# Patient Record
Sex: FEMALE | Race: WHITE | NOT HISPANIC OR LATINO | Employment: FULL TIME | URBAN - METROPOLITAN AREA
[De-identification: names, ages, dates, MRNs, and addresses within clinical notes are randomized per-mention and may not be internally consistent; named-entity substitution may affect disease eponyms.]

---

## 2017-01-19 ENCOUNTER — OFFICE VISIT (OUTPATIENT)
Dept: SURGERY | Facility: CLINIC | Age: 52
End: 2017-01-19

## 2017-01-19 VITALS
SYSTOLIC BLOOD PRESSURE: 132 MMHG | WEIGHT: 269 LBS | DIASTOLIC BLOOD PRESSURE: 88 MMHG | HEIGHT: 65 IN | BODY MASS INDEX: 44.82 KG/M2

## 2017-01-19 DIAGNOSIS — Z09 FOLLOW UP: Primary | ICD-10-CM

## 2017-01-19 PROCEDURE — 99211 OFF/OP EST MAY X REQ PHY/QHP: CPT | Performed by: SURGERY

## 2017-01-19 RX ORDER — FLUTICASONE PROPIONATE 50 MCG
SPRAY, SUSPENSION (ML) NASAL
Refills: 1 | COMMUNITY
Start: 2016-11-03

## 2017-01-19 RX ORDER — PANTOPRAZOLE SODIUM 40 MG/1
TABLET, DELAYED RELEASE ORAL
Refills: 0 | COMMUNITY
Start: 2017-01-04

## 2017-01-19 RX ORDER — ERGOCALCIFEROL 1.25 MG/1
CAPSULE ORAL
Refills: 2 | COMMUNITY
Start: 2017-01-02

## 2017-01-19 RX ORDER — IBUPROFEN 600 MG/1
TABLET ORAL
Refills: 2 | COMMUNITY
Start: 2017-01-02 | End: 2017-12-05

## 2017-01-19 NOTE — MR AVS SNAPSHOT
Dafne Elizalde   1/19/2017 3:45 PM   Office Visit    Dept Phone:  [phone]   Encounter #:  [id number]    Provider:  Tay Alvarado MD   Department:  [facility] GENERAL SURGERY                Your Full Care Plan              Today's Medication Changes          These changes are accurate as of: 1/19/17  3:46 PM.  If you have any questions, ask your nurse or doctor.               Stop taking medication(s)listed here:     lansoprazole 30 MG capsule   Commonly known as:  PREVACID   Stopped by:  Tay Alvarado MD           oxymetazoline 0.05 % nasal spray   Commonly known as:  AFRIN   Stopped by:  Tay Alvarado MD                      Your Updated Medication List          This list is accurate as of: 1/19/17  3:46 PM.  Always use your most recent med list.                fenofibrate micronized 134 MG capsule   Commonly known as:  LOFIBRA   TAKE 1 CAPSULE DAILY       FIBER 7 PO       fluticasone 50 MCG/ACT nasal spray   Commonly known as:  FLONASE       ibuprofen 600 MG tablet   Commonly known as:  ADVIL,MOTRIN       pantoprazole 40 MG EC tablet   Commonly known as:  PROTONIX       valsartan-hydrochlorothiazide 160-25 MG per tablet   Commonly known as:  DIOVAN-HCT   TAKE 1 TABLET DAILY       vitamin D 63982 UNITS capsule capsule   Commonly known as:  ERGOCALCIFEROL               Instructions     None    Patient Instructions History      Upcoming Appointments     Visit Type Date Time Department    OFFICE VISIT 1/19/2017  3:45 PM [facility]      cashcloud Signup     [facility] cashcloud allows you to send messages to your doctor, view your test results, renew your prescriptions, schedule appointments, and more. To sign up, go to [employer] and click on the Sign Up Now link in the New User? box. Enter your cashcloud Activation Code exactly as it appears below along with the last four digits of your Social Security Number and your Date  "of Birth () to complete the sign-up process. If you do not sign up before the expiration date, you must request a new code.    [employer] Activation Code: [email or web address]  Expires: 2017  3:46 PM    If you have questions, you can email [email or web address] or call [phone] to talk to our [employer] staff. Remember, [employer] is NOT to be used for urgent needs. For medical emergencies, dial 911.               Other Info from Your Visit           Allergies     No Known Allergies      Reason for Visit     Follow-up           Vital Signs     Blood Pressure Height Weight Body Mass Index Smoking Status       132/88 65\" (165.1 cm) 269 lb (122 kg) 44.76 kg/m2 Never Smoker         "

## 2017-01-19 NOTE — PROGRESS NOTES
PATIENT INFORMATION  Dafne Elizalde  4 MO FU, COLOSTOMY CLOSURE DONE IN 2015, DISCUSS C/S, PT IS W/O COMPLAINTS     - 1965    CHIEF COMPLAINT  Chief Complaint   Patient presents with   • Follow-up       HISTORY OF PRESENT ILLNESS  HPI    Patient doing well status post colostomy closure.  She is tolerating her diet.  Regular bowel movements.  She has no complaints.      REVIEW OF SYSTEMS  Review of Systems      ACTIVE PROBLEMS  Patient Active Problem List    Diagnosis   • Abnormal computed tomography scan [R93.8]   • Abnormal serum creatinine level [R79.9]   • Acute secretory otitis media [H65.00]   • Acute sinusitis [J01.90]   • Anemia [D64.9]   • Ankle joint pain [M25.579]   • Calcaneal spur [M77.30]   • Cellulitis [L03.90]   • Cholelithiasis [K80.20]   • Chronic sinusitis [J32.9]   • Degeneration of intervertebral disc of lumbosacral region [M51.37]   • Diverticulitis of large intestine [K57.32]   • Diverticulitis of intestine [K57.92]   • Dysuria [R30.0]   • Edema [R60.9]   • Gastroesophageal reflux disease with esophagitis [K21.0]   • Fatigue [R53.83]   • Foot pain [M79.673]   • Diffuse goiter [E04.9]   • Hyperglycemia [R73.9]   • Hyperlipidemia [E78.5]   • Hypertension [I10]   • Hypothyroidism [E03.9]   • Arthralgia of hip [M25.559]   • Morbid obesity [E66.01]   • Otitis externa [H60.90]   • Otitis media [H66.90]   • Bile-induced gastritis [K29.60]   • Urinary tract infection [N39.0]   • Open wound [T14.8]   • Breast lump in female [N63]         PAST MEDICAL HISTORY  Past Medical History   Diagnosis Date   • Anemia    • Arthritis    • Benign fundic gland polyps of stomach      of large intestine   • Cardiac murmur    • Disease of thyroid gland    • Diverticulosis    • Esophageal reflux    • High cholesterol    • History of colonic polyps    • Hole in the ear drum    • Hypertension    • Internal hemorrhoids    • Otitis externa    • Peptic ulcer    • Pyloric channel ulcer    • Wound infection after  surgery          SURGICAL HISTORY  Past Surgical History   Procedure Laterality Date   • Cholecystectomy     • Colon surgery     • Hysterectomy     • Hand surgery     • Upper gastrointestinal endoscopy     • Colostomy     • Colostomy closure     • Lumbar laminectomy     • Hemicolectomy     • Mouth surgery       tooth extraction         FAMILY HISTORY  Family History   Problem Relation Age of Onset   • Hypertension Mother    • Hyperlipidemia Mother    • Colonic polyp Mother    • Other Mother      malignant neoplasm   • Colon cancer Mother    • Hypertension Father    • Stroke Father    • Cancer Sister    • Other Sister      malignant neoplasm   • Cancer Brother    • Other Brother      malignant neoplasm   • Cancer Maternal Grandmother    • Stroke Paternal Grandmother    • Other Other      malignant neoplasm         SOCIAL HISTORY  Social History     Occupational History   • Not on file.     Social History Main Topics   • Smoking status: Never Smoker   • Smokeless tobacco: Not on file   • Alcohol use Yes   • Drug use: Not on file   • Sexual activity: Not on file         CURRENT MEDICATIONS    Current Outpatient Prescriptions:   •  Misc Natural Products (FIBER 7 PO), Take  by mouth., Disp: , Rfl:   •  fenofibrate micronized (LOFIBRA) 134 MG capsule, TAKE 1 CAPSULE DAILY, Disp: 90 capsule, Rfl: 3  •  fluticasone (FLONASE) 50 MCG/ACT nasal spray, INSTILL 1 SPRAY IN EACH NOSTRIL ONCE A DAY NASALLY 90 DAYS, Disp: , Rfl: 1  •  ibuprofen (ADVIL,MOTRIN) 600 MG tablet, TAKE ONE TABLET EVERY 6 HOURS AS NEEDED FOR PAIN, Disp: , Rfl: 2  •  pantoprazole (PROTONIX) 40 MG EC tablet, TAKE ONE TABLET BY MOUTH DAILY FOR 90 DAYS, Disp: , Rfl: 0  •  valsartan-hydrochlorothiazide (DIOVAN-HCT) 160-25 MG per tablet, TAKE 1 TABLET DAILY, Disp: 90 tablet, Rfl: 2  •  vitamin D (ERGOCALCIFEROL) 37740 UNITS capsule capsule, TAKE 1 CAPSULE BY MOUTH ONCE A WEEK, Disp: , Rfl: 2    ALLERGIES  Review of patient's allergies indicates no known  "allergies.    VITALS  Vitals:    01/19/17 1515   BP: 132/88   Weight: 269 lb (122 kg)   Height: 65\" (165.1 cm)       LAST RESULTS   Abstract on 12/18/2015   Component Date Value Ref Range Status   •  Colonoscopy 08/19/2015 OP: LEE, GREGORIO POLYPS; DIVERTICULOSIS; INTERNAL HEMORRHOIDS   Final     No results found.    PHYSICAL EXAM  Physical Exam     Abdominal exam is benign.  No tenderness.  No organomegaly.  No masses.  No evidence of herniation.      ASSESSMENT    Doing well status post colostomy closure.      PLAN      Follow-up with GI for her routine colonoscopy.  We will see back on a when necessary basis.                        "

## 2017-02-09 ENCOUNTER — TELEPHONE (OUTPATIENT)
Dept: GASTROENTEROLOGY | Facility: CLINIC | Age: 52
End: 2017-02-09

## 2017-10-20 ENCOUNTER — TELEPHONE (OUTPATIENT)
Dept: GASTROENTEROLOGY | Facility: CLINIC | Age: 52
End: 2017-10-20

## 2017-10-20 DIAGNOSIS — Z80.0 FAMILY HISTORY OF COLON CANCER REQUIRING SCREENING COLONOSCOPY: ICD-10-CM

## 2017-10-20 DIAGNOSIS — Z86.010 HX OF COLONIC POLYPS: Primary | ICD-10-CM

## 2017-10-20 PROBLEM — Z86.0100 HX OF COLONIC POLYPS: Status: ACTIVE | Noted: 2017-10-20

## 2017-10-20 RX ORDER — SODIUM PICOSULFATE, MAGNESIUM OXIDE, AND ANHYDROUS CITRIC ACID 10; 3.5; 12 MG/16.1G; G/16.1G; G/16.1G
1 POWDER, METERED ORAL 2 TIMES DAILY
Qty: 1 EACH | Refills: 0 | Status: ON HOLD | OUTPATIENT
Start: 2017-10-20 | End: 2017-12-06

## 2017-10-20 NOTE — TELEPHONE ENCOUNTER
Calling to schedule colonoscopy. Patient has been scheduled on 12/6/17 to arrive at 10/15/17. Instructions have been mailed to them.

## 2017-12-05 ENCOUNTER — ANESTHESIA EVENT (OUTPATIENT)
Dept: PERIOP | Facility: HOSPITAL | Age: 52
End: 2017-12-05

## 2017-12-05 RX ORDER — MELOXICAM 15 MG/1
15 TABLET ORAL DAILY
COMMUNITY
End: 2020-02-27 | Stop reason: ALTCHOICE

## 2017-12-06 ENCOUNTER — ANESTHESIA (OUTPATIENT)
Dept: PERIOP | Facility: HOSPITAL | Age: 52
End: 2017-12-06

## 2017-12-06 ENCOUNTER — HOSPITAL ENCOUNTER (OUTPATIENT)
Facility: HOSPITAL | Age: 52
Setting detail: HOSPITAL OUTPATIENT SURGERY
Discharge: HOME | End: 2017-12-06
Attending: INTERNAL MEDICINE | Admitting: INTERNAL MEDICINE

## 2017-12-06 VITALS
HEART RATE: 73 BPM | SYSTOLIC BLOOD PRESSURE: 135 MMHG | TEMPERATURE: 97.9 F | BODY MASS INDEX: 46.08 KG/M2 | RESPIRATION RATE: 18 BRPM | DIASTOLIC BLOOD PRESSURE: 94 MMHG | OXYGEN SATURATION: 96 % | HEIGHT: 65 IN | WEIGHT: 276.6 LBS

## 2017-12-06 DIAGNOSIS — Z86.010 HX OF COLONIC POLYPS: ICD-10-CM

## 2017-12-06 DIAGNOSIS — Z80.0 FAMILY HISTORY OF COLON CANCER REQUIRING SCREENING COLONOSCOPY: ICD-10-CM

## 2017-12-06 PROCEDURE — 25010000002 PROPOFOL 10 MG/ML EMULSION: Performed by: ANESTHESIOLOGY

## 2017-12-06 PROCEDURE — 45385 COLONOSCOPY W/LESION REMOVAL: CPT | Performed by: INTERNAL MEDICINE

## 2017-12-06 PROCEDURE — 45380 COLONOSCOPY AND BIOPSY: CPT | Performed by: INTERNAL MEDICINE

## 2017-12-06 RX ORDER — PROPOFOL 10 MG/ML
VIAL (ML) INTRAVENOUS AS NEEDED
Status: DISCONTINUED | OUTPATIENT
Start: 2017-12-06 | End: 2017-12-06 | Stop reason: SURG

## 2017-12-06 RX ORDER — MAGNESIUM HYDROXIDE 1200 MG/15ML
LIQUID ORAL AS NEEDED
Status: DISCONTINUED | OUTPATIENT
Start: 2017-12-06 | End: 2017-12-06 | Stop reason: HOSPADM

## 2017-12-06 RX ORDER — LIDOCAINE HYDROCHLORIDE 20 MG/ML
INJECTION, SOLUTION INFILTRATION; PERINEURAL AS NEEDED
Status: DISCONTINUED | OUTPATIENT
Start: 2017-12-06 | End: 2017-12-06 | Stop reason: SURG

## 2017-12-06 RX ORDER — SODIUM CHLORIDE, SODIUM LACTATE, POTASSIUM CHLORIDE, CALCIUM CHLORIDE 600; 310; 30; 20 MG/100ML; MG/100ML; MG/100ML; MG/100ML
9 INJECTION, SOLUTION INTRAVENOUS CONTINUOUS
Status: DISCONTINUED | OUTPATIENT
Start: 2017-12-06 | End: 2017-12-06 | Stop reason: HOSPADM

## 2017-12-06 RX ORDER — SODIUM CHLORIDE 9 MG/ML
40 INJECTION, SOLUTION INTRAVENOUS AS NEEDED
Status: DISCONTINUED | OUTPATIENT
Start: 2017-12-06 | End: 2017-12-06 | Stop reason: HOSPADM

## 2017-12-06 RX ORDER — LIDOCAINE HYDROCHLORIDE 10 MG/ML
0.5 INJECTION, SOLUTION EPIDURAL; INFILTRATION; INTRACAUDAL; PERINEURAL ONCE AS NEEDED
Status: COMPLETED | OUTPATIENT
Start: 2017-12-06 | End: 2017-12-06

## 2017-12-06 RX ORDER — SODIUM CHLORIDE 0.9 % (FLUSH) 0.9 %
1-10 SYRINGE (ML) INJECTION AS NEEDED
Status: DISCONTINUED | OUTPATIENT
Start: 2017-12-06 | End: 2017-12-06 | Stop reason: HOSPADM

## 2017-12-06 RX ADMIN — PROPOFOL 900 MG: 10 INJECTION, EMULSION INTRAVENOUS at 10:22

## 2017-12-06 RX ADMIN — LIDOCAINE HYDROCHLORIDE 80 MG: 20 INJECTION, SOLUTION INFILTRATION; PERINEURAL at 10:22

## 2017-12-06 RX ADMIN — SODIUM CHLORIDE, POTASSIUM CHLORIDE, SODIUM LACTATE AND CALCIUM CHLORIDE 9 ML/HR: 600; 310; 30; 20 INJECTION, SOLUTION INTRAVENOUS at 10:10

## 2017-12-06 RX ADMIN — LIDOCAINE HYDROCHLORIDE 0.1 ML: 10 INJECTION, SOLUTION EPIDURAL; INFILTRATION; INTRACAUDAL; PERINEURAL at 10:10

## 2017-12-06 RX ADMIN — LIDOCAINE HYDROCHLORIDE 1 ML: 20 JELLY TOPICAL at 10:59

## 2017-12-06 NOTE — PLAN OF CARE
Problem: Patient Care Overview (Adult)  Goal: Plan of Care Review  Outcome: Ongoing (interventions implemented as appropriate)    12/06/17 1010   Coping/Psychosocial Response Interventions   Plan Of Care Reviewed With patient;friend   Patient Care Overview   Progress no change   Outcome Evaluation   Outcome Summary/Follow up Plan VSS, C/O CHRONIC GEN. PAIN, READY FOR PROCEDURE       Goal: Adult Individualization and Mutuality  Outcome: Ongoing (interventions implemented as appropriate)    Problem: GI Endoscopy (Adult)  Goal: Signs and Symptoms of Listed Potential Problems Will be Absent or Manageable (GI Endoscopy)  Outcome: Ongoing (interventions implemented as appropriate)

## 2017-12-06 NOTE — H&P
[facility]  Pre-procedure H&P    Chief Complaint: history of polyps    Dafne Elizalde is a 52 y.o. female  who is referred by Beena Wall MD for a colonoscopy. She is an Asymptomatic person Age 50 years and older who has a history of previous adenomatous polyp.   She has had colonoscopy 2 years ago with abnormalities..    She denies any change in bowel function, melena, hematochezia or unexplained weight loss.    Past Medical History:   Diagnosis Date   • Anemia    • Arthritis    • Benign fundic gland polyps of stomach     of large intestine   • Cardiac murmur    • Disease of thyroid gland    • Diverticulosis    • Esophageal reflux    • High cholesterol    • History of colonic polyps    • Hole in the ear drum    • Hypertension    • Internal hemorrhoids    • Otitis externa    • Peptic ulcer    • Pyloric channel ulcer    • Wound infection after surgery        Past Surgical History:   Procedure Laterality Date   • CHOLECYSTECTOMY     • COLON SURGERY     • COLOSTOMY     • COLOSTOMY CLOSURE     • HAND SURGERY     • HEMICOLECTOMY     • HYSTERECTOMY     • LUMBAR LAMINECTOMY     • MOUTH SURGERY      tooth extraction   • UPPER GASTROINTESTINAL ENDOSCOPY         Family History   Problem Relation Age of Onset   • Hypertension Mother    • Hyperlipidemia Mother    • Colonic polyp Mother    • Other Mother      malignant neoplasm   • Colon cancer Mother    • Hypertension Father    • Stroke Father    • Cancer Sister    • Other Sister      malignant neoplasm   • Cancer Brother    • Other Brother      malignant neoplasm   • Cancer Maternal Grandmother    • Stroke Paternal Grandmother    • Other Other      malignant neoplasm       Social History     Social History   • Marital status: Single     Spouse name: N/A   • Number of children: N/A   • Years of education: N/A     Occupational History   • Not on file.     Social History Main Topics   • Smoking status: Current Every Day Smoker     Packs/day: 1.00      Years: 40.00     Types: Cigarettes, Electronic Cigarette   • Smokeless tobacco: Never Used   • Alcohol use Yes      Comment: occ   • Drug use: No   • Sexual activity: Defer     Other Topics Concern   • Not on file     Social History Narrative         Current Facility-Administered Medications:   •  lactated ringers infusion, 9 mL/hr, Intravenous, Continuous, Trenton Carias CRNA, Last Rate: 9 mL/hr at 12/06/17 1010, 9 mL/hr at 12/06/17 1010  •  sodium chloride 0.9 % flush 1-10 mL, 1-10 mL, Intravenous, PRN, Trenton Carias CRNA  •  sodium chloride 0.9 % infusion 40 mL, 40 mL, Intravenous, PRN, Trenton Carias CRNA  •  sterile water 1,000 mL with simethicone 40 MG/0.6ML 3 mL mixture, , , PRN, Beena Wall MD, 150 mL at 12/06/17 0947  •  sterile water irrigation solution, , , PRN, Beena Wall MD, 500 mL at 12/06/17 0947    No Known Allergies         Vitals:    12/06/17 1004   BP: 136/74   Pulse: 75   Resp: 16   Temp: 97.9 °F (36.6 °C)   SpO2: 94%       Physical Exam:   General: patient awake, alert and cooperative   Eyes: Normal lids and lashes, no scleral icterus   Neck: supple, normal ROM   Skin: warm and dry, not jaundiced   Cardiovascular: regular rhythm and rate, no murmurs auscultated   Pulm: clear to auscultation bilaterally, regular and unlabored   Abdomen: soft, nontender, nondistended; normal bowel sounds   Extremities: no rash or edema   Psychiatric: Normal mood and behavior         Assessment/Plan       [unfilled]    Schedule for colonoscopy.      Indications, risks and procedure were discussed with the patient, including but not limited to, bleeding, infection, possibility of perforation and possible polypectomy. All of the patient's questions were answered, and signed informed consent was obtained and placed on the chart.         Beena Wall MD  [facility]  [unfilled]

## 2017-12-06 NOTE — PLAN OF CARE
Problem: GI Endoscopy (Adult)  Goal: Signs and Symptoms of Listed Potential Problems Will be Absent or Manageable (GI Endoscopy)  Outcome: Ongoing (interventions implemented as appropriate)    12/06/17 4676   GI Endoscopy   Problems Assessed (GI Endoscopy) all   Problems Present (GI Endoscopy) none

## 2017-12-06 NOTE — ANESTHESIA POSTPROCEDURE EVALUATION
Patient: Dafne Elizalde    Procedure Summary     Date Anesthesia Start Anesthesia Stop Room / Location    12/06/17 1019 1126 [facility] / [facility]       Procedure Diagnosis Surgeon Provider    COLONOSCOPY with polpectomy (N/A ) Hx of colonic polyps; Family history of colon cancer requiring screening colonoscopy  (Hx of colonic polyps [Z86.010]; Family history of colon cancer requiring screening colonoscopy [Z80.0]) MD Charu Ortega MD          Anesthesia Type: MAC  Last vitals  BP   135/94 (12/06/17 1151)   Temp   97.9 °F (36.6 °C) (12/06/17 1127)   Pulse   73 (12/06/17 1151)   Resp   18 (12/06/17 1151)     SpO2   96 % (12/06/17 1151)     Post Anesthesia Care and Evaluation    Patient location during evaluation: PHASE II  Patient participation: complete - patient participated  Level of consciousness: awake and alert  Pain score: 0  Pain management: adequate  Airway patency: patent  Anesthetic complications: No anesthetic complications  PONV Status: none  Cardiovascular status: acceptable  Respiratory status: acceptable  Hydration status: acceptable

## 2017-12-06 NOTE — ANESTHESIA PREPROCEDURE EVALUATION
Anesthesia Evaluation     Patient summary reviewed and Nursing notes reviewed   NPO Solid Status: > 8 hours  NPO Liquid Status: > 8 hours     Airway   Mallampati: I  TM distance: >3 FB  Neck ROM: limited  no difficulty expected  Dental - normal exam     Pulmonary - normal exam   (+) a smoker (1 ppd) Current, shortness of breath,   Cardiovascular - normal exam    Rhythm: regular  Rate: normal    (+) hypertension, valvular problems/murmurs murmur, hyperlipidemia      Neuro/Psych- negative ROS  GI/Hepatic/Renal/Endo    (+) obesity, morbid obesity, GERD well controlled, PUD, hypothyroidism,     Musculoskeletal     Abdominal   (+) obese,    Substance History      OB/GYN          Other   (+) arthritis     (-) history of cancer                                  Anesthesia Plan    ASA 3     MAC     intravenous and inhalational induction   Anesthetic plan and risks discussed with patient and other.

## 2017-12-06 NOTE — OP NOTE
Colonoscopy Note:    Indication:  History of colon polyps    Consent: Procedure colonoscopy was explained to the patient and detail including but not limited to the, patient of bleeding perforation and possible reactions to sedation.    Sedation: Sedation was provided by anesthesia.    Procedure:  After excellent sedation digital rectal examinations performed and a flexible colonoscope was inserted into the rectum passed to about the sigmoid colon where her previous anastomosis is noted.  Since widely patent.  The scope disease was traversed and to the cecum the cecum was identified by both the ileocecal valve and the appendiceal orifice.  The overall bowel preparation was fair.  Upon withdrawal the scope careful examination mucosa was made.  Pertinent findings include a total of 3 descending colon polyps one was 7 mm removed completely using snare cautery the other 2 were between 2 and 4 mm sessile removed or sepsis.  One of the descending polyps were put in a separate bottle as this is removed in a piecemeal fashion.  Upon further withdrawal scope to transverse polyps was seen and removed completely with forceps, they were between 3 and 4 mm sessile.  In the descending colon she had a total of single polyp that was 3 mm sessile removed with forceps.  In the sigmoid colon she had mild diverticulosis.  The scope was slowly withdrawn to the rectum and retroflex were internal hemorrhoids are noted.  The scope was straightened and withdrawn out of the patient with no immediate call patient's and she tolerated procedure well.    Impression/Plan:  Colon polyps removed with forceps and snare cautery  Diverticulosis  Internal hemorrhoids  We'll await biopsy results.  Colonoscopy the recommended based on pathology results but likely within one to 2 years.

## 2017-12-06 NOTE — PLAN OF CARE
Problem: Patient Care Overview (Adult)  Goal: Plan of Care Review  Outcome: Ongoing (interventions implemented as appropriate)    12/06/17 1101   Coping/Psychosocial Response Interventions   Plan Of Care Reviewed With patient;family   Patient Care Overview   Progress improving       Goal: Adult Individualization and Mutuality  Outcome: Ongoing (interventions implemented as appropriate)  Goal: Discharge Needs Assessment  Outcome: Ongoing (interventions implemented as appropriate)    Problem: GI Endoscopy (Adult)  Goal: Signs and Symptoms of Listed Potential Problems Will be Absent or Manageable (GI Endoscopy)  Outcome: Ongoing (interventions implemented as appropriate)

## 2017-12-11 LAB
LAB AP CASE REPORT: NORMAL
Lab: NORMAL
PATH REPORT.FINAL DX SPEC: NORMAL

## 2018-01-03 PROBLEM — D12.2 ADENOMATOUS POLYP OF ASCENDING COLON: Status: ACTIVE | Noted: 2018-01-03

## 2019-08-14 ENCOUNTER — OFFICE VISIT (OUTPATIENT)
Dept: GASTROENTEROLOGY | Facility: CLINIC | Age: 54
End: 2019-08-14

## 2019-08-14 VITALS
WEIGHT: 287.4 LBS | DIASTOLIC BLOOD PRESSURE: 76 MMHG | BODY MASS INDEX: 47.88 KG/M2 | HEIGHT: 65 IN | SYSTOLIC BLOOD PRESSURE: 124 MMHG

## 2019-08-14 DIAGNOSIS — Z86.010 HISTORY OF COLONIC POLYPS: ICD-10-CM

## 2019-08-14 DIAGNOSIS — R10.9 ABDOMINAL PAIN, UNSPECIFIED ABDOMINAL LOCATION: Primary | ICD-10-CM

## 2019-08-14 PROCEDURE — 99214 OFFICE O/P EST MOD 30 MIN: CPT | Performed by: INTERNAL MEDICINE

## 2019-08-14 RX ORDER — LEVOCETIRIZINE DIHYDROCHLORIDE 5 MG/1
TABLET, FILM COATED ORAL
Refills: 0 | COMMUNITY
Start: 2019-06-10

## 2019-08-14 RX ORDER — METFORMIN HYDROCHLORIDE 500 MG/1
500 TABLET, EXTENDED RELEASE ORAL DAILY
Refills: 0 | COMMUNITY
Start: 2019-06-21

## 2019-08-14 NOTE — PROGRESS NOTES
PATIENT INFORMATION  Dafne Elizalde       - 1965    CHIEF COMPLAINT  Chief Complaint   Patient presents with   • Abdominal Pain       HISTORY OF PRESENT ILLNESS  HPI    55 yo with lower abdominal pain radiating down to groin area since February. PCP ordered a pelvic US and Abdominal US at [facility] , both reportedly normal.  Pain is worse when going from sitting to standing position. She is not aware of any injury prior to this.    No fever or chills. No new changes in bowel habits.  Last cls was in -several polyps removed. She is on recall for 2019.    REVIEW OF SYSTEMS  Review of Systems   Gastrointestinal: Positive for abdominal pain.   Genitourinary: Positive for frequency and urgency.   All other systems reviewed and are negative.        ACTIVE PROBLEMS  Patient Active Problem List    Diagnosis   • Adenomatous polyp of ascending colon [D12.2]   • Hx of colonic polyps [Z86.010]   • Family history of colon cancer requiring screening colonoscopy [Z80.0]   • Abnormal computed tomography scan [R93.89]   • Abnormal serum creatinine level [R79.9]   • Acute secretory otitis media [H65.00]   • Acute sinusitis [J01.90]   • Anemia [D64.9]   • Ankle joint pain [M25.579]   • Calcaneal spur [M77.30]   • Cellulitis [L03.90]   • Cholelithiasis [K80.20]   • Chronic sinusitis [J32.9]   • Degeneration of intervertebral disc of lumbosacral region [M51.37]   • Diverticulitis of large intestine [K57.32]   • Diverticulitis of intestine [K57.92]   • Dysuria [R30.0]   • Edema [R60.9]   • Gastroesophageal reflux disease with esophagitis [K21.0]   • Fatigue [R53.83]   • Foot pain [M79.673]   • Diffuse goiter [E04.9]   • Hyperglycemia [R73.9]   • Hyperlipidemia [E78.5]   • Hypertension [I10]   • Hypothyroidism [E03.9]   • Arthralgia of hip [M25.559]   • Morbid obesity (CMS/HCC) [E66.01]   • Otitis externa [H60.90]   • Otitis media [H66.90]   • Bile-induced gastritis [K29.60]   • Urinary tract infection  [N39.0]   • Open wound [T14.8XXA]   • Breast lump in female [N63.0]         PAST MEDICAL HISTORY  Past Medical History:   Diagnosis Date   • Anemia    • Arthritis    • Benign fundic gland polyps of stomach     of large intestine   • Cardiac murmur    • Colon polyp    • Disease of thyroid gland    • Diverticulosis    • Esophageal reflux    • High cholesterol    • History of colonic polyps    • Hole in the ear drum    • Hypertension    • Internal hemorrhoids    • Otitis externa    • Peptic ulcer    • Pyloric channel ulcer    • Wound infection after surgery          SURGICAL HISTORY  Past Surgical History:   Procedure Laterality Date   • CHOLECYSTECTOMY     • COLON SURGERY     • COLONOSCOPY N/A 12/6/2017    Procedure: COLONOSCOPY with polpectomy;  Surgeon: Beena Wall MD;  Location: [facility];  Service:    • COLOSTOMY     • COLOSTOMY CLOSURE     • HAND SURGERY     • HEMICOLECTOMY     • HYSTERECTOMY     • LUMBAR LAMINECTOMY     • MOUTH SURGERY      tooth extraction   • UPPER GASTROINTESTINAL ENDOSCOPY           FAMILY HISTORY  Family History   Problem Relation Age of Onset   • Hypertension Mother    • Hyperlipidemia Mother    • Colonic polyp Mother    • Other Mother         malignant neoplasm   • Colon cancer Mother    • Hypertension Father    • Stroke Father    • Cancer Sister    • Other Sister         malignant neoplasm   • Cancer Brother    • Other Brother         malignant neoplasm   • Cancer Maternal Grandmother    • Stroke Paternal Grandmother    • Other Other         malignant neoplasm         SOCIAL HISTORY  Social History     Occupational History   • Not on file   Tobacco Use   • Smoking status: Current Every Day Smoker     Packs/day: 1.00     Years: 40.00     Pack years: 40.00     Types: Cigarettes, Electronic Cigarette   • Smokeless tobacco: Never Used   Substance and Sexual Activity   • Alcohol use: Yes     Comment: occ   • Drug use: No   • Sexual activity: Defer       Debilities/Disabilities Identified:  "None    Emotional Behavior: Appropriate    CURRENT MEDICATIONS    Current Outpatient Medications:   •  fenofibrate micronized (LOFIBRA) 134 MG capsule, TAKE 1 CAPSULE DAILY, Disp: 90 capsule, Rfl: 3  •  fluticasone (FLONASE) 50 MCG/ACT nasal spray, INSTILL 1 SPRAY IN EACH NOSTRIL ONCE A DAY NASALLY 90 DAYS, Disp: , Rfl: 1  •  levocetirizine (XYZAL) 5 MG tablet, TAKE 1 TABLET BY MOUTH IN THE EVENING 90, Disp: , Rfl: 0  •  meloxicam (MOBIC) 15 MG tablet, Take 15 mg by mouth Daily., Disp: , Rfl:   •  metFORMIN ER (GLUCOPHAGE-XR) 500 MG 24 hr tablet, Take 500 mg by mouth Daily. with food, Disp: , Rfl: 0  •  Misc Natural Products (FIBER 7 PO), Take 1 tablet by mouth 2 (Two) Times a Day., Disp: , Rfl:   •  pantoprazole (PROTONIX) 40 MG EC tablet, TAKE ONE TABLET BY MOUTH DAILY FOR 90 DAYS, Disp: , Rfl: 0  •  valsartan-hydrochlorothiazide (DIOVAN-HCT) 160-25 MG per tablet, TAKE 1 TABLET DAILY, Disp: 90 tablet, Rfl: 2  •  vitamin D (ERGOCALCIFEROL) 76581 UNITS capsule capsule, TAKE 1 CAPSULE BY MOUTH ONCE A WEEK, Disp: , Rfl: 2    ALLERGIES  Patient has no known allergies.    VITALS  Vitals:    08/14/19 1517   BP: 124/76   Weight: 130 kg (287 lb 6.4 oz)   Height: 165.1 cm (65\")       LAST RESULTS   Admission on 12/06/2017, Discharged on 12/06/2017   Component Date Value Ref Range Status   • Case Report 12/06/2017    Final                    Value:Surgical Pathology Report                         Case: [id number]                                  Authorizing Provider:  Beena Wall MD          Collected:           12/06/2017 10:41 AM          Ordering Location:     [facility]   Received:            12/06/2017 02:44 PM                                 OR                                                                           Pathologist:           Maria Eugenia Lane MD                                                           Specimens:   1) - Large Intestine, Right / Ascending Colon, ascending polyp x2           "                         2) - Large Intestine, Right / Ascending Colon, Ascending Polyp #2                                   3) - Large Intestine, Transverse Colon, trasverse polyp x2                                          4) - Large Intestine, Left / Descending Colon                                             • Final Diagnosis 12/06/2017    Final                    Value:This result contains rich text formatting which cannot be displayed here.     No results found.    PHYSICAL EXAM  Physical Exam   Constitutional: She is oriented to person, place, and time. She appears well-developed and well-nourished. No distress.   HENT:   Head: Normocephalic and atraumatic.   Mouth/Throat: Oropharynx is clear and moist.   Eyes: EOM are normal. Pupils are equal, round, and reactive to light.   Neck: Normal range of motion. No tracheal deviation present.   Cardiovascular: Normal rate, regular rhythm, normal heart sounds and intact distal pulses. Exam reveals no gallop and no friction rub.   No murmur heard.  Pulmonary/Chest: Effort normal and breath sounds normal. No stridor. No respiratory distress. She has no wheezes. She has no rales. She exhibits no tenderness.   Abdominal: Soft. Bowel sounds are normal. She exhibits no distension. There is no tenderness. There is no rebound and no guarding.   Diffuse lower abdominal pain   Musculoskeletal: She exhibits no edema.   Lymphadenopathy:     She has no cervical adenopathy.   Neurological: She is alert and oriented to person, place, and time.   Skin: Skin is warm. She is not diaphoretic.   Psychiatric: She has a normal mood and affect. Her behavior is normal. Judgment and thought content normal.   Nursing note and vitals reviewed.      ASSESSMENT  Diagnoses and all orders for this visit:    Abdominal pain, unspecified abdominal location  -     CT Abdomen Pelvis With Contrast; Future    History of colonic polyps    Other orders  -     metFORMIN ER (GLUCOPHAGE-XR) 500 MG 24 hr  tablet; Take 500 mg by mouth Daily. with food  -     levocetirizine (XYZAL) 5 MG tablet; TAKE 1 TABLET BY MOUTH IN THE EVENING 90          PLAN  No Follow-up on file.        On recall for cls in December.  Plan ct now.

## 2019-08-20 ENCOUNTER — HOSPITAL ENCOUNTER (OUTPATIENT)
Dept: CT IMAGING | Facility: HOSPITAL | Age: 54
Discharge: HOME | End: 2019-08-20
Admitting: INTERNAL MEDICINE

## 2019-08-20 DIAGNOSIS — R10.9 ABDOMINAL PAIN, UNSPECIFIED ABDOMINAL LOCATION: ICD-10-CM

## 2019-08-20 PROCEDURE — 0 DIATRIZOATE MEGLUMINE & SODIUM PER 1 ML: Performed by: INTERNAL MEDICINE

## 2019-08-20 PROCEDURE — 74177 CT ABD & PELVIS W/CONTRAST: CPT

## 2019-08-20 PROCEDURE — 0 IOPAMIDOL PER 1 ML: Performed by: INTERNAL MEDICINE

## 2019-08-20 RX ADMIN — IOPAMIDOL 100 ML: 755 INJECTION, SOLUTION INTRAVENOUS at 11:00

## 2019-08-20 RX ADMIN — DIATRIZOATE MEGLUMINE AND DIATRIZOATE SODIUM 30 ML: 600; 100 SOLUTION ORAL; RECTAL at 09:30

## 2019-08-20 NOTE — PROGRESS NOTES
Let her know ct with no evidence of diverticulitis. Postop changes from her previous surgery. Small 5 mm lung nodule noted. Please forward CT to pcp. Radiologist does not recommend any further follow up for this, but let the pcp know.

## 2019-09-19 ENCOUNTER — APPOINTMENT (OUTPATIENT)
Dept: LAB | Facility: HOSPITAL | Age: 54
End: 2019-09-19

## 2019-09-19 ENCOUNTER — OFFICE VISIT (OUTPATIENT)
Dept: GASTROENTEROLOGY | Facility: CLINIC | Age: 54
End: 2019-09-19

## 2019-09-19 VITALS
BODY MASS INDEX: 48.52 KG/M2 | SYSTOLIC BLOOD PRESSURE: 122 MMHG | HEIGHT: 65 IN | WEIGHT: 291.2 LBS | DIASTOLIC BLOOD PRESSURE: 78 MMHG

## 2019-09-19 DIAGNOSIS — R10.30 LOWER ABDOMINAL PAIN: Primary | ICD-10-CM

## 2019-09-19 DIAGNOSIS — R39.15 URINARY URGENCY: ICD-10-CM

## 2019-09-19 LAB
BILIRUB UR QL STRIP: NEGATIVE
CLARITY UR: CLEAR
COLOR UR: YELLOW
GLUCOSE UR STRIP-MCNC: NEGATIVE MG/DL
HGB UR QL STRIP.AUTO: NEGATIVE
KETONES UR QL STRIP: NEGATIVE
LEUKOCYTE ESTERASE UR QL STRIP.AUTO: NEGATIVE
NITRITE UR QL STRIP: NEGATIVE
PH UR STRIP.AUTO: 6.5 [PH] (ref 5–8)
PROT UR QL STRIP: NEGATIVE
SP GR UR STRIP: 1.01 (ref 1–1.03)
UROBILINOGEN UR QL STRIP: NORMAL

## 2019-09-19 PROCEDURE — 99213 OFFICE O/P EST LOW 20 MIN: CPT | Performed by: INTERNAL MEDICINE

## 2019-09-19 PROCEDURE — 81003 URINALYSIS AUTO W/O SCOPE: CPT | Performed by: INTERNAL MEDICINE

## 2019-09-19 NOTE — PROGRESS NOTES
PATIENT INFORMATION  Dafne Elizalde       - 1965    CHIEF COMPLAINT  Chief Complaint   Patient presents with   • Follow-up     3 week follow up on abd pain       HISTORY OF PRESENT ILLNESS  HPI    She is here in follow up still with same pain, worse with standing and better when lying down. Lifting makes this worse.  CT:  IMPRESSION:  1. Fatty infiltration of the liver.  2. Surgical absence of the gallbladder.  3. Postoperative changes involving the transverse colon. Diverticulosis.  No evidence of diverticulitis.  4. 5 mm noncalcified nodule posterior medial right lower lobe.  Management recommendation: Based on current published guidelines,  uncomplicated pulmonary nodules less than 6 mm do not require CT  follow-up in low risk patients (Fleischner Society guidelines, 2017).    She has noticed some urinary urgency and frequency.    REVIEW OF SYSTEMS  Review of Systems   Constitutional: Positive for diaphoresis and fatigue.   HENT: Positive for congestion and sinus pressure.    Respiratory: Positive for apnea.    Gastrointestinal: Positive for abdominal pain.        Relux   Genitourinary: Positive for frequency, pelvic pain and urgency.   Musculoskeletal: Positive for arthralgias and back pain.   Allergic/Immunologic: Positive for environmental allergies.   All other systems reviewed and are negative.        ACTIVE PROBLEMS  Patient Active Problem List    Diagnosis   • Adenomatous polyp of ascending colon [D12.2]   • Hx of colonic polyps [Z86.010]   • Family history of colon cancer requiring screening colonoscopy [Z80.0]   • Abnormal computed tomography scan [R93.89]   • Abnormal serum creatinine level [R79.9]   • Acute secretory otitis media [H65.00]   • Acute sinusitis [J01.90]   • Anemia [D64.9]   • Ankle joint pain [M25.579]   • Calcaneal spur [M77.30]   • Cellulitis [L03.90]   • Cholelithiasis [K80.20]   • Chronic sinusitis [J32.9]   • Degeneration of intervertebral disc of lumbosacral region  [M51.37]   • Diverticulitis of large intestine [K57.32]   • Diverticulitis of intestine [K57.92]   • Dysuria [R30.0]   • Edema [R60.9]   • Gastroesophageal reflux disease with esophagitis [K21.0]   • Fatigue [R53.83]   • Foot pain [M79.673]   • Diffuse goiter [E04.9]   • Hyperglycemia [R73.9]   • Hyperlipidemia [E78.5]   • Hypertension [I10]   • Hypothyroidism [E03.9]   • Arthralgia of hip [M25.559]   • Morbid obesity (CMS/HCC) [E66.01]   • Otitis externa [H60.90]   • Otitis media [H66.90]   • Bile-induced gastritis [K29.60]   • Urinary tract infection [N39.0]   • Open wound [T14.8XXA]   • Breast lump in female [N63.0]         PAST MEDICAL HISTORY  Past Medical History:   Diagnosis Date   • Anemia    • Arthritis    • Benign fundic gland polyps of stomach     of large intestine   • Cardiac murmur    • Colon polyp    • Disease of thyroid gland    • Diverticulosis    • Esophageal reflux    • High cholesterol    • History of colonic polyps    • Hole in the ear drum    • Hypertension    • Internal hemorrhoids    • Otitis externa    • Peptic ulcer    • Pyloric channel ulcer    • Wound infection after surgery          SURGICAL HISTORY  Past Surgical History:   Procedure Laterality Date   • CHOLECYSTECTOMY     • COLON SURGERY     • COLONOSCOPY N/A 12/6/2017    Procedure: COLONOSCOPY with polpectomy;  Surgeon: Beena Wall MD;  Location: [facility];  Service:    • COLOSTOMY     • COLOSTOMY CLOSURE     • HAND SURGERY     • HEMICOLECTOMY     • HYSTERECTOMY     • LUMBAR LAMINECTOMY     • MOUTH SURGERY      tooth extraction   • UPPER GASTROINTESTINAL ENDOSCOPY           FAMILY HISTORY  Family History   Problem Relation Age of Onset   • Hypertension Mother    • Hyperlipidemia Mother    • Colonic polyp Mother    • Other Mother         malignant neoplasm   • Colon cancer Mother    • Hypertension Father    • Stroke Father    • Cancer Sister    • Other Sister         malignant neoplasm   • Cancer Brother    • Other Brother          "malignant neoplasm   • Cancer Maternal Grandmother    • Stroke Paternal Grandmother    • Other Other         malignant neoplasm         SOCIAL HISTORY  Social History     Occupational History   • Not on file   Tobacco Use   • Smoking status: Current Every Day Smoker     Packs/day: 1.00     Years: 40.00     Pack years: 40.00     Types: Cigarettes, Electronic Cigarette   • Smokeless tobacco: Never Used   Substance and Sexual Activity   • Alcohol use: Yes     Comment: occ   • Drug use: No   • Sexual activity: Defer       Debilities/Disabilities Identified: None    Emotional Behavior: Appropriate    CURRENT MEDICATIONS    Current Outpatient Medications:   •  fenofibrate micronized (LOFIBRA) 134 MG capsule, TAKE 1 CAPSULE DAILY, Disp: 90 capsule, Rfl: 3  •  fluticasone (FLONASE) 50 MCG/ACT nasal spray, INSTILL 1 SPRAY IN EACH NOSTRIL ONCE A DAY NASALLY 90 DAYS, Disp: , Rfl: 1  •  levocetirizine (XYZAL) 5 MG tablet, TAKE 1 TABLET BY MOUTH IN THE EVENING 90, Disp: , Rfl: 0  •  meloxicam (MOBIC) 15 MG tablet, Take 15 mg by mouth Daily., Disp: , Rfl:   •  metFORMIN ER (GLUCOPHAGE-XR) 500 MG 24 hr tablet, Take 500 mg by mouth Daily. with food, Disp: , Rfl: 0  •  Misc Natural Products (FIBER 7 PO), Take 1 tablet by mouth 2 (Two) Times a Day., Disp: , Rfl:   •  pantoprazole (PROTONIX) 40 MG EC tablet, TAKE ONE TABLET BY MOUTH DAILY FOR 90 DAYS, Disp: , Rfl: 0  •  valsartan-hydrochlorothiazide (DIOVAN-HCT) 160-25 MG per tablet, TAKE 1 TABLET DAILY, Disp: 90 tablet, Rfl: 2  •  vitamin D (ERGOCALCIFEROL) 37033 UNITS capsule capsule, TAKE 1 CAPSULE BY MOUTH ONCE A WEEK, Disp: , Rfl: 2    ALLERGIES  Patient has no known allergies.    VITALS  Vitals:    09/19/19 1110   BP: 122/78   Weight: 132 kg (291 lb 3.2 oz)   Height: 165.1 cm (65\")       LAST RESULTS   Admission on 12/06/2017, Discharged on 12/06/2017   Component Date Value Ref Range Status   • Case Report 12/06/2017    Final                    Value:Surgical Pathology Report    "                      Case: [id number]                                  Authorizing Provider:  Beena Wall MD          Collected:           12/06/2017 10:41 AM          Ordering Location:     [facility]   Received:            12/06/2017 02:44 PM                                 OR                                                                           Pathologist:           Maria Eugenia Lane MD                                                           Specimens:   1) - Large Intestine, Right / Ascending Colon, ascending polyp x2                                   2) - Large Intestine, Right / Ascending Colon, Ascending Polyp #2                                   3) - Large Intestine, Transverse Colon, trasverse polyp x2                                          4) - Large Intestine, Left / Descending Colon                                             • Final Diagnosis 12/06/2017    Final                    Value:This result contains rich text formatting which cannot be displayed here.     No results found.    PHYSICAL EXAM  Physical Exam   Constitutional: She is oriented to person, place, and time. She appears well-developed and well-nourished. No distress.   HENT:   Head: Normocephalic and atraumatic.   Mouth/Throat: Oropharynx is clear and moist.   Eyes: EOM are normal. Pupils are equal, round, and reactive to light.   Neck: Normal range of motion. No tracheal deviation present.   Cardiovascular: Normal rate, regular rhythm, normal heart sounds and intact distal pulses. Exam reveals no gallop and no friction rub.   No murmur heard.  Pulmonary/Chest: Effort normal and breath sounds normal. No stridor. No respiratory distress. She has no wheezes. She has no rales. She exhibits no tenderness.   Abdominal: Soft. Bowel sounds are normal. She exhibits no distension. There is no tenderness. There is no rebound and no guarding.   Musculoskeletal: She exhibits no edema.   Lymphadenopathy:     She has no cervical  adenopathy.   Neurological: She is alert and oriented to person, place, and time.   Skin: Skin is warm. She is not diaphoretic.   Psychiatric: She has a normal mood and affect. Her behavior is normal. Judgment and thought content normal.   Nursing note and vitals reviewed.      ASSESSMENT  Diagnoses and all orders for this visit:    Lower abdominal pain  -     Urinalysis With Culture If Indicated -    Urinary urgency  -     Urinalysis With Culture If Indicated -          PLAN  No Follow-up on file.    Keep colon for December.  Ct with no evidence of diverticulitis  Suspect pain is likely musculoskeletal.  Pelvis US already done.-get results.

## 2019-10-18 ENCOUNTER — TELEPHONE (OUTPATIENT)
Dept: GASTROENTEROLOGY | Facility: CLINIC | Age: 54
End: 2019-10-18

## 2019-10-18 DIAGNOSIS — Z80.0 FAMILY HISTORY OF COLON CANCER REQUIRING SCREENING COLONOSCOPY: ICD-10-CM

## 2019-10-18 DIAGNOSIS — Z86.010 HISTORY OF COLONIC POLYPS: Primary | ICD-10-CM

## 2019-11-22 ENCOUNTER — PREP FOR SURGERY (OUTPATIENT)
Dept: OTHER | Facility: HOSPITAL | Age: 54
End: 2019-11-22

## 2019-11-22 PROBLEM — Z86.0100 HISTORY OF COLONIC POLYPS: Status: ACTIVE | Noted: 2019-11-22

## 2019-11-22 PROBLEM — Z86.010 HISTORY OF COLONIC POLYPS: Status: ACTIVE | Noted: 2019-11-22

## 2019-11-22 RX ORDER — SODIUM, POTASSIUM,MAG SULFATES 17.5-3.13G
1 SOLUTION, RECONSTITUTED, ORAL ORAL EVERY 12 HOURS
Qty: 2 BOTTLE | Refills: 0 | Status: ON HOLD | OUTPATIENT
Start: 2019-11-22 | End: 2020-01-10

## 2019-11-22 NOTE — TELEPHONE ENCOUNTER
emailed instructions    Dr. Beena Wall   Date: _____1/10/20_________     Arrival Time: __8:15_______    Hospital:  [facility] ([address] [place] [address] [place] [address]) (back of hospital at the emergency room)        Please call the office as soon as possible if you need to reschedule or cancel your procedure please give two weeks’ notice.  If you do not show up or frequently reschedule your procedure, your provider has the option of not rescheduling.    Stop the following medications 5 days prior to your procedure- check with the prescribing doctor prior to holding.  No Aspirin, Advil, Aleve, Motrin, IBU or anything listed on the back of this form.    If you are taking any medications on the attached list and/or pills that contain iron please stop them one week prior. Insulin will be addressed separately.    1.  your prescription AND a 10 oz. bottle of Magnesium Citrate (over the counter) as soon as possible. Do not wait until the day before in case of issues with cost or etc.    The day before your procedure  It is very important that you follow the instructions on this form and not on the prep you were prescribed.    You will be on a clear liquid diet only which may include coffee , tea, soft drinks, broth(chicken beef or vegetable), popsicles, Jell-O, Gatorade, juice (no orange, grapefruit or V-8).  ®No red or purple dyes and no dairy or non-dairy.    2. At 8:00 am drink the bottle of magnesium citrate.  Drink plenty of fluids in between    3. At    2:00 pm drink first dose or ½ of prep, mix as directed on container/box    Drink plenty of fluids between    4. At   10:00 pm      drink second dose or ½ of prep, mix as directed on container/box    5. If you start to get nauseated while drinking your prep try stepping away for 15-20 min. then resume again at a slower pace.    You may have clear liquids only up to 4 hours before your procedure.  No gum or candy!     No smoking or tobacco  products!    You may only take your morning prescription blood pressure (no diuretic combinations), breathing, seizure, psych or heart medications.     You will need a  to accompany you for your exam. Bus or uber not allowed The average time spent in our facility is around 2-3 hours.  You are not to drive, operate machinery or make legal decisions the remainder of the day following you procedure.    Please call [email or web address]@ [phone] if you have questions about any of this information.  If it is after hours or the weekend and you need to reach the doctor or have questions for the office please call [phone].     It is your responsibility to check with your insurance company to determine benefits and out of pocket costs.      Avoid these medications 5-7 days prior to surgery  Please check with your prescribing doctor before stopping any medications  NSAIDS- Advil, Aleve, Motrin, Ibuprofen, Midol, Excedrin, Fiorinal, [place]  (Some cold medications may have these in them)    All herbal medications- iron, vitamin E, fish oil, decongestants (phenylephrine, pseudoephedrine), ginkgo, garlic, ginseng, Tawanda’s wart    Mobic (meloxicam), Celebrex, Diclofenac (Voltaren), Nambumetone (Relafen), Daypro, Naproxen, Sulindac, Indomethacin, Toradol, Feldine, Salsalate, Etodolac (Lodine),     Viagra, Cialis, Levitra    Aspirin, Plavix (clopidogrel), Effient, Pletal, Coumadin, Pradaxa, Brilinta, Ticlide, Eliquis, (Xaralto- 3 days)      Diet pills -Adipex (phentermine) -2 weeks prior

## 2020-01-09 ENCOUNTER — ANESTHESIA EVENT (OUTPATIENT)
Dept: PERIOP | Facility: HOSPITAL | Age: 55
End: 2020-01-09

## 2020-01-10 ENCOUNTER — HOSPITAL ENCOUNTER (OUTPATIENT)
Facility: HOSPITAL | Age: 55
Setting detail: HOSPITAL OUTPATIENT SURGERY
Discharge: HOME | End: 2020-01-10
Attending: INTERNAL MEDICINE | Admitting: INTERNAL MEDICINE

## 2020-01-10 ENCOUNTER — ANESTHESIA (OUTPATIENT)
Dept: PERIOP | Facility: HOSPITAL | Age: 55
End: 2020-01-10

## 2020-01-10 VITALS
DIASTOLIC BLOOD PRESSURE: 66 MMHG | HEART RATE: 63 BPM | WEIGHT: 277.2 LBS | TEMPERATURE: 97.1 F | SYSTOLIC BLOOD PRESSURE: 100 MMHG | RESPIRATION RATE: 16 BRPM | BODY MASS INDEX: 46.13 KG/M2 | OXYGEN SATURATION: 95 %

## 2020-01-10 DIAGNOSIS — Z80.0 FAMILY HISTORY OF COLON CANCER REQUIRING SCREENING COLONOSCOPY: ICD-10-CM

## 2020-01-10 DIAGNOSIS — Z86.010 HISTORY OF COLONIC POLYPS: ICD-10-CM

## 2020-01-10 LAB
GLUCOSE BLDC GLUCOMTR-MCNC: 141 MG/DL (ref 70–130)
POTASSIUM BLD-SCNC: 3.5 MMOL/L (ref 3.5–5.2)

## 2020-01-10 PROCEDURE — 84132 ASSAY OF SERUM POTASSIUM: CPT | Performed by: ANESTHESIOLOGY

## 2020-01-10 PROCEDURE — 25010000002 PROPOFOL 10 MG/ML EMULSION: Performed by: NURSE ANESTHETIST, CERTIFIED REGISTERED

## 2020-01-10 PROCEDURE — 82962 GLUCOSE BLOOD TEST: CPT

## 2020-01-10 PROCEDURE — 88305 TISSUE EXAM BY PATHOLOGIST: CPT | Performed by: INTERNAL MEDICINE

## 2020-01-10 PROCEDURE — 93005 ELECTROCARDIOGRAM TRACING: CPT | Performed by: NURSE ANESTHETIST, CERTIFIED REGISTERED

## 2020-01-10 PROCEDURE — 45380 COLONOSCOPY AND BIOPSY: CPT | Performed by: INTERNAL MEDICINE

## 2020-01-10 PROCEDURE — 45385 COLONOSCOPY W/LESION REMOVAL: CPT | Performed by: INTERNAL MEDICINE

## 2020-01-10 PROCEDURE — 25010000003 LIDOCAINE 1 % SOLUTION: Performed by: NURSE ANESTHETIST, CERTIFIED REGISTERED

## 2020-01-10 PROCEDURE — 93010 ELECTROCARDIOGRAM REPORT: CPT | Performed by: INTERNAL MEDICINE

## 2020-01-10 RX ORDER — GLYCOPYRROLATE 0.2 MG/ML
INJECTION INTRAMUSCULAR; INTRAVENOUS AS NEEDED
Status: DISCONTINUED | OUTPATIENT
Start: 2020-01-10 | End: 2020-01-10 | Stop reason: SURG

## 2020-01-10 RX ORDER — SODIUM CHLORIDE 0.9 % (FLUSH) 0.9 %
10 SYRINGE (ML) INJECTION AS NEEDED
Status: DISCONTINUED | OUTPATIENT
Start: 2020-01-10 | End: 2020-01-10 | Stop reason: HOSPADM

## 2020-01-10 RX ORDER — LIDOCAINE HYDROCHLORIDE 10 MG/ML
0.5 INJECTION, SOLUTION EPIDURAL; INFILTRATION; INTRACAUDAL; PERINEURAL ONCE AS NEEDED
Status: DISCONTINUED | OUTPATIENT
Start: 2020-01-10 | End: 2020-01-10 | Stop reason: HOSPADM

## 2020-01-10 RX ORDER — PROPOFOL 10 MG/ML
VIAL (ML) INTRAVENOUS AS NEEDED
Status: DISCONTINUED | OUTPATIENT
Start: 2020-01-10 | End: 2020-01-10 | Stop reason: SURG

## 2020-01-10 RX ORDER — PROPOFOL 10 MG/ML
VIAL (ML) INTRAVENOUS CONTINUOUS PRN
Status: DISCONTINUED | OUTPATIENT
Start: 2020-01-10 | End: 2020-01-10 | Stop reason: SURG

## 2020-01-10 RX ORDER — SODIUM CHLORIDE, SODIUM LACTATE, POTASSIUM CHLORIDE, CALCIUM CHLORIDE 600; 310; 30; 20 MG/100ML; MG/100ML; MG/100ML; MG/100ML
9 INJECTION, SOLUTION INTRAVENOUS CONTINUOUS
Status: DISCONTINUED | OUTPATIENT
Start: 2020-01-10 | End: 2020-01-10 | Stop reason: HOSPADM

## 2020-01-10 RX ORDER — SODIUM CHLORIDE 0.9 % (FLUSH) 0.9 %
10 SYRINGE (ML) INJECTION EVERY 12 HOURS SCHEDULED
Status: DISCONTINUED | OUTPATIENT
Start: 2020-01-10 | End: 2020-01-10 | Stop reason: HOSPADM

## 2020-01-10 RX ORDER — LIDOCAINE HYDROCHLORIDE 10 MG/ML
INJECTION, SOLUTION INFILTRATION; PERINEURAL AS NEEDED
Status: DISCONTINUED | OUTPATIENT
Start: 2020-01-10 | End: 2020-01-10 | Stop reason: SURG

## 2020-01-10 RX ORDER — SODIUM CHLORIDE 9 MG/ML
40 INJECTION, SOLUTION INTRAVENOUS AS NEEDED
Status: DISCONTINUED | OUTPATIENT
Start: 2020-01-10 | End: 2020-01-10 | Stop reason: HOSPADM

## 2020-01-10 RX ADMIN — PROPOFOL 50 MG: 10 INJECTION, EMULSION INTRAVENOUS at 10:16

## 2020-01-10 RX ADMIN — SODIUM CHLORIDE, POTASSIUM CHLORIDE, SODIUM LACTATE AND CALCIUM CHLORIDE: 600; 310; 30; 20 INJECTION, SOLUTION INTRAVENOUS at 09:45

## 2020-01-10 RX ADMIN — GLYCOPYRROLATE 0.1 MG: 0.2 INJECTION INTRAMUSCULAR; INTRAVENOUS at 09:49

## 2020-01-10 RX ADMIN — PROPOFOL 50 MG: 10 INJECTION, EMULSION INTRAVENOUS at 09:51

## 2020-01-10 RX ADMIN — PROPOFOL 100 MCG/KG/MIN: 10 INJECTION, EMULSION INTRAVENOUS at 09:50

## 2020-01-10 RX ADMIN — PROPOFOL 50 MG: 10 INJECTION, EMULSION INTRAVENOUS at 09:58

## 2020-01-10 RX ADMIN — PROPOFOL 50 MG: 10 INJECTION, EMULSION INTRAVENOUS at 09:53

## 2020-01-10 RX ADMIN — PROPOFOL 50 MG: 10 INJECTION, EMULSION INTRAVENOUS at 10:04

## 2020-01-10 RX ADMIN — PROPOFOL 40 MG: 10 INJECTION, EMULSION INTRAVENOUS at 10:32

## 2020-01-10 RX ADMIN — PROPOFOL 40 MG: 10 INJECTION, EMULSION INTRAVENOUS at 10:20

## 2020-01-10 RX ADMIN — PROPOFOL 50 MG: 10 INJECTION, EMULSION INTRAVENOUS at 10:10

## 2020-01-10 RX ADMIN — LIDOCAINE HYDROCHLORIDE 50 MG: 10 INJECTION, SOLUTION INFILTRATION; PERINEURAL at 09:49

## 2020-01-10 RX ADMIN — PROPOFOL 40 MG: 10 INJECTION, EMULSION INTRAVENOUS at 10:26

## 2020-01-10 NOTE — ANESTHESIA PREPROCEDURE EVALUATION
Anesthesia Evaluation     Patient summary reviewed   no history of anesthetic complications:  NPO Solid Status: > 8 hours  NPO Liquid Status: > 2 hours           Airway   Mallampati: II  TM distance: >3 FB  Neck ROM: full  No difficulty expected  Dental - normal exam     Pulmonary - normal exam   (+) a smoker (1 ppd x 40 yrs) Current Abstained day of surgery, sleep apnea (not tested),   Cardiovascular - normal exam  Exercise tolerance: good (4-7 METS)    ECG reviewed  Rhythm: regular  Rate: normal    (+) hypertension less than 2 medications, valvular problems/murmurs murmur, [place], hyperlipidemia,     ROS comment: EKG discussed w/ pt and providers, to follow up post procedure    Neuro/Psych  (+) numbness (r leg),     GI/Hepatic/Renal/Endo    (+) morbid obesity, GERD well controlled, PUD,  diabetes mellitus (accu check 141) type 2 well controlled, thyroid problem thyroid nodules    Musculoskeletal     (+) back pain,   Abdominal   (+) obese,    Substance History - negative use     OB/GYN          Other   arthritis,                    Anesthesia Plan    ASA 4     MAC       Anesthetic plan, all risks, benefits, and alternatives have been provided, discussed and informed consent has been obtained with: patient.  Use of blood products discussed with patient  Consented to blood products.

## 2020-01-10 NOTE — H&P
PATIENT INFORMATION  Dafne Elizalde         - 1965     CHIEF COMPLAINT       Chief Complaint   Patient presents with   • Follow-up       3 week follow up on abd pain         HISTORY OF PRESENT ILLNESS  HPI     She is here in follow up still with same pain, worse with standing and better when lying down. Lifting makes this worse.  CT:  IMPRESSION:  1. Fatty infiltration of the liver.  2. Surgical absence of the gallbladder.  3. Postoperative changes involving the transverse colon. Diverticulosis.  No evidence of diverticulitis.  4. 5 mm noncalcified nodule posterior medial right lower lobe.  Management recommendation: Based on current published guidelines,  uncomplicated pulmonary nodules less than 6 mm do not require CT  follow-up in low risk patients (Fleischner Society guidelines, 2017).     She has noticed some urinary urgency and frequency.     REVIEW OF SYSTEMS  Review of Systems   Constitutional: Positive for diaphoresis and fatigue.   HENT: Positive for congestion and sinus pressure.    Respiratory: Positive for apnea.    Gastrointestinal: Positive for abdominal pain.        Relux   Genitourinary: Positive for frequency, pelvic pain and urgency.   Musculoskeletal: Positive for arthralgias and back pain.   Allergic/Immunologic: Positive for environmental allergies.   All other systems reviewed and are negative.           ACTIVE PROBLEMS      Patient Active Problem List     Diagnosis   • Adenomatous polyp of ascending colon [D12.2]   • Hx of colonic polyps [Z86.010]   • Family history of colon cancer requiring screening colonoscopy [Z80.0]   • Abnormal computed tomography scan [R93.89]   • Abnormal serum creatinine level [R79.9]   • Acute secretory otitis media [H65.00]   • Acute sinusitis [J01.90]   • Anemia [D64.9]   • Ankle joint pain [M25.579]   • Calcaneal spur [M77.30]   • Cellulitis [L03.90]   • Cholelithiasis [K80.20]   • Chronic sinusitis [J32.9]   • Degeneration of intervertebral disc of  lumbosacral region [M51.37]   • Diverticulitis of large intestine [K57.32]   • Diverticulitis of intestine [K57.92]   • Dysuria [R30.0]   • Edema [R60.9]   • Gastroesophageal reflux disease with esophagitis [K21.0]   • Fatigue [R53.83]   • Foot pain [M79.673]   • Diffuse goiter [E04.9]   • Hyperglycemia [R73.9]   • Hyperlipidemia [E78.5]   • Hypertension [I10]   • Hypothyroidism [E03.9]   • Arthralgia of hip [M25.559]   • Morbid obesity (CMS/HCC) [E66.01]   • Otitis externa [H60.90]   • Otitis media [H66.90]   • Bile-induced gastritis [K29.60]   • Urinary tract infection [N39.0]   • Open wound [T14.8XXA]   • Breast lump in female [N63.0]            PAST MEDICAL HISTORY  Medical History        Past Medical History:   Diagnosis Date   • Anemia     • Arthritis     • Benign fundic gland polyps of stomach       of large intestine   • Cardiac murmur     • Colon polyp     • Disease of thyroid gland     • Diverticulosis     • Esophageal reflux     • High cholesterol     • History of colonic polyps     • Hole in the ear drum     • Hypertension     • Internal hemorrhoids     • Otitis externa     • Peptic ulcer     • Pyloric channel ulcer     • Wound infection after surgery                 SURGICAL HISTORY  Surgical History         Past Surgical History:   Procedure Laterality Date   • CHOLECYSTECTOMY       • COLON SURGERY       • COLONOSCOPY N/A 12/6/2017     Procedure: COLONOSCOPY with polpectomy;  Surgeon: Beena aWll MD;  Location: [facility];  Service:    • COLOSTOMY       • COLOSTOMY CLOSURE       • HAND SURGERY       • HEMICOLECTOMY       • HYSTERECTOMY       • LUMBAR LAMINECTOMY       • MOUTH SURGERY         tooth extraction   • UPPER GASTROINTESTINAL ENDOSCOPY                   FAMILY HISTORY        Family History   Problem Relation Age of Onset   • Hypertension Mother     • Hyperlipidemia Mother     • Colonic polyp Mother     • Other Mother           malignant neoplasm   • Colon cancer Mother     • Hypertension  Father     • Stroke Father     • Cancer Sister     • Other Sister           malignant neoplasm   • Cancer Brother     • Other Brother           malignant neoplasm   • Cancer Maternal Grandmother     • Stroke Paternal Grandmother     • Other Other           malignant neoplasm            SOCIAL HISTORY  Social History            Occupational History   • Not on file   Tobacco Use   • Smoking status: Current Every Day Smoker       Packs/day: 1.00       Years: 40.00       Pack years: 40.00       Types: Cigarettes, Electronic Cigarette   • Smokeless tobacco: Never Used   Substance and Sexual Activity   • Alcohol use: Yes       Comment: occ   • Drug use: No   • Sexual activity: Defer         Debilities/Disabilities Identified: None     Emotional Behavior: Appropriate     CURRENT MEDICATIONS     Current Outpatient Medications:   •  fenofibrate micronized (LOFIBRA) 134 MG capsule, TAKE 1 CAPSULE DAILY, Disp: 90 capsule, Rfl: 3  •  fluticasone (FLONASE) 50 MCG/ACT nasal spray, INSTILL 1 SPRAY IN EACH NOSTRIL ONCE A DAY NASALLY 90 DAYS, Disp: , Rfl: 1  •  levocetirizine (XYZAL) 5 MG tablet, TAKE 1 TABLET BY MOUTH IN THE EVENING 90, Disp: , Rfl: 0  •  meloxicam (MOBIC) 15 MG tablet, Take 15 mg by mouth Daily., Disp: , Rfl:   •  metFORMIN ER (GLUCOPHAGE-XR) 500 MG 24 hr tablet, Take 500 mg by mouth Daily. with food, Disp: , Rfl: 0  •  Misc Natural Products (FIBER 7 PO), Take 1 tablet by mouth 2 (Two) Times a Day., Disp: , Rfl:   •  pantoprazole (PROTONIX) 40 MG EC tablet, TAKE ONE TABLET BY MOUTH DAILY FOR 90 DAYS, Disp: , Rfl: 0  •  valsartan-hydrochlorothiazide (DIOVAN-HCT) 160-25 MG per tablet, TAKE 1 TABLET DAILY, Disp: 90 tablet, Rfl: 2  •  vitamin D (ERGOCALCIFEROL) 02915 UNITS capsule capsule, TAKE 1 CAPSULE BY MOUTH ONCE A WEEK, Disp: , Rfl: 2     ALLERGIES  Patient has no known allergies.     VITALS  /64 (BP Location: Left arm, Patient Position: Lying)   Pulse 73   Temp 98 °F (36.7 °C) (Oral)   Resp 13   Wt  126 kg (277 lb 3.2 oz)   SpO2 94%   BMI 46.13 kg/m²               LAST RESULTS                    Admission on 12/06/2017, Discharged on 12/06/2017   Component Date Value Ref Range Status    • Case Report 12/06/2017     Final                    Value:Surgical Pathology Report                         Case: [id number]                                  Authorizing Provider:  Beena Wall MD          Collected:           12/06/2017 10:41 AM          Ordering Location:     [facility]   Received:            12/06/2017 02:44 PM                                 OR                                                                           Pathologist:           Maria Eugenia Lane MD                                                           Specimens:   1) - Large Intestine, Right / Ascending Colon, ascending polyp x2                                   2) - Large Intestine, Right / Ascending Colon, Ascending Polyp #2                                   3) - Large Intestine, Transverse Colon, trasverse polyp x2                                          4) - Large Intestine, Left / Descending Colon                                              • Final Diagnosis 12/06/2017     Final                    Value:This result contains rich text formatting which cannot be displayed here.      No results found.     PHYSICAL EXAM  Physical Exam   Constitutional: She is oriented to person, place, and time. She appears well-developed and well-nourished. No distress.   HENT:   Head: Normocephalic and atraumatic.   Mouth/Throat: Oropharynx is clear and moist.   Eyes: EOM are normal. Pupils are equal, round, and reactive to light.   Neck: Normal range of motion. No tracheal deviation present.   Cardiovascular: Normal rate, regular rhythm, normal heart sounds and intact distal pulses. Exam reveals no gallop and no friction rub.   No murmur heard.  Pulmonary/Chest: Effort normal and breath sounds normal. No stridor. No respiratory  distress. She has no wheezes. She has no rales. She exhibits no tenderness.   Abdominal: Soft. Bowel sounds are normal. She exhibits no distension. There is no tenderness. There is no rebound and no guarding.   Musculoskeletal: She exhibits no edema.   Lymphadenopathy:     She has no cervical adenopathy.   Neurological: She is alert and oriented to person, place, and time.   Skin: Skin is warm. She is not diaphoretic.   Psychiatric: She has a normal mood and affect. Her behavior is normal. Judgment and thought content normal.   Nursing note and vitals reviewed.        ASSESSMENT  Diagnoses and all orders for this visit:     Lower abdominal pain  -     Urinalysis With Culture If Indicated -     Urinary urgency  -     Urinalysis With Culture If Indicated -              PLAN  No Follow-up on file.     Keep colon for December.  Ct with no evidence of diverticulitis  Suspect pain is likely musculoskeletal.  Pelvis US already done.-get results.

## 2020-01-10 NOTE — OP NOTE
Patient Name:  Dafne Elizalde  YOB: 1965    Date of Procedure:  1/10/2020    Procedure Performed: Colonoscopy  Indications: History of polyps, family history of colon cancer    Pre-op Diagnosis:   History of colonic polyps [Z86.010]  Family history of colon cancer requiring screening colonoscopy [Z80.0]    Post-Op Diagnosis Codes:     * History of colonic polyps [Z86.010]     * Family history of colon cancer requiring screening colonoscopy [Z80.0]         Staff:  Surgeon(s):  Beena Wall MD         Consent: Procedure colonoscopy indications, risks and procedure were discussed with the patient, including but not limited to, bleeding, infection, possibility of perforation and possible polypectomy. All of the patient's questions were answered, and signed informed consent was obtained and placed on the chart.      Sedation: Sedation was provided by anesthesia.      Estimated Blood Loss: minimal    Specimens:   ID Type Source Tests Collected by Time   A : Ascending polyp x 3 Polyp Large Intestine, Right / Ascending Colon TISSUE PATHOLOGY EXAM Beena Wall MD 1/10/2020 1001   B : Transverse polyp x 2 Polyp Large Intestine, Transverse Colon TISSUE PATHOLOGY EXAM Beena Wall MD 1/10/2020 1011   C : Sigmoid polyp x 3- hot snare x 2, forcep x 1 Polyp Large Intestine, Sigmoid Colon TISSUE PATHOLOGY EXAM Beena Wall MD 1/10/2020 1018   D : Rectal polyp x 1 Polyp Large Intestine, Rectum TISSUE PATHOLOGY EXAM Beena Wall MD 1/10/2020 1032         Description of Procedure:   After excellent sedation a digital rectal examination is performed a flexible colonoscope was inserted into the rectum passed to the cecum.  The cecum was identified by both the ileocecal valve and the appendiceal orifice.  The overall bowel preparation was fair.  Upon withdrawal scope careful examination mucosa was made.  Pertinent findings include total of 3 ascending colon polyps are about 3 to 4 mm sessile removed with  forceps, total of 2 transverse colon polyps 3 to 4 mm sessile removed with forceps.  Total of 3 sigmoid colon polyps 2 were about 5 mm removed with using snare cautery the other one was removed using forceps.  In the rectal area there was a 3 mm polyp removed with forceps.  She had diverticulosis noted in the sigmoid colon.  The anastomosis on the left side is noted and appears patent and clean.  25 minutes     Withdrawal time: 25 minutes    Quality of bowel preparation: Fair    Findings:   Polyps removed using snare cautery and forceps  Diverticulosis  Internal hemorrhoids  Await biopsy results.  Repeat colonoscopy will likely be recommended for 3 years but based on pathology results    Complications: None        Beena Wall MD     Date: 1/10/2020  Time: 10:35 AM

## 2020-01-10 NOTE — ANESTHESIA POSTPROCEDURE EVALUATION
Patient: Dafne Elizalde    Procedure Summary     Date:  01/10/20 Room / Location:  [facility] ENDOSCOPY 2 /  [facility]    Anesthesia Start:  0945 Anesthesia Stop:  1036    Procedure:  COLONOSCOPY, polypectomy (N/A ) Diagnosis:       History of colonic polyps      Family history of colon cancer requiring screening colonoscopy      (History of colonic polyps [Z86.010])      (Family history of colon cancer requiring screening colonoscopy [Z80.0])    Surgeon:  Beena Wall MD Provider:  Jeffrey Wilkinson CRNA    Anesthesia Type:  MAC ASA Status:  4          Anesthesia Type: MAC    Vitals  Vitals Value Taken Time   /59 1/10/2020 10:50 AM   Temp 97.1 °F (36.2 °C) 1/10/2020 10:40 AM   Pulse 69 1/10/2020 10:50 AM   Resp 16 1/10/2020 10:50 AM   SpO2 92 % 1/10/2020 10:50 AM           Post Anesthesia Care and Evaluation    Patient location during evaluation: PHASE II  Patient participation: complete - patient participated  Level of consciousness: awake and alert  Pain score: 0  Pain management: adequate  Airway patency: patent  Anesthetic complications: No anesthetic complications  PONV Status: none  Cardiovascular status: acceptable  Respiratory status: acceptable  Hydration status: acceptable

## 2020-01-13 LAB
CYTO UR: NORMAL
LAB AP CASE REPORT: NORMAL
PATH REPORT.FINAL DX SPEC: NORMAL
PATH REPORT.GROSS SPEC: NORMAL

## 2020-01-13 NOTE — PROGRESS NOTES
She is supposed to see cardiology.  Can you make sure she has an appointment.  Please forward a copy of this EKG to her PCP

## 2020-01-20 DIAGNOSIS — R94.31 ABNORMAL EKG: Primary | ICD-10-CM

## 2020-01-21 ENCOUNTER — APPOINTMENT (OUTPATIENT)
Dept: SLEEP MEDICINE | Facility: HOSPITAL | Age: 55
End: 2020-01-21

## 2020-02-18 ENCOUNTER — APPOINTMENT (OUTPATIENT)
Dept: SLEEP MEDICINE | Facility: HOSPITAL | Age: 55
End: 2020-02-18

## 2020-02-27 ENCOUNTER — OFFICE VISIT (OUTPATIENT)
Dept: CARDIOLOGY | Facility: CLINIC | Age: 55
End: 2020-02-27

## 2020-02-27 ENCOUNTER — LAB (OUTPATIENT)
Dept: LAB | Facility: HOSPITAL | Age: 55
End: 2020-02-27

## 2020-02-27 VITALS
WEIGHT: 288.1 LBS | HEIGHT: 65 IN | SYSTOLIC BLOOD PRESSURE: 120 MMHG | BODY MASS INDEX: 48 KG/M2 | HEART RATE: 63 BPM | RESPIRATION RATE: 18 BRPM | OXYGEN SATURATION: 98 % | DIASTOLIC BLOOD PRESSURE: 68 MMHG

## 2020-02-27 DIAGNOSIS — I44.4 LEFT ANTERIOR FASCICULAR BLOCK: ICD-10-CM

## 2020-02-27 DIAGNOSIS — E66.01 MORBIDLY OBESE (HCC): ICD-10-CM

## 2020-02-27 DIAGNOSIS — E78.5 HYPERLIPIDEMIA LDL GOAL <100: ICD-10-CM

## 2020-02-27 DIAGNOSIS — R06.02 EXERTIONAL SHORTNESS OF BREATH: Primary | ICD-10-CM

## 2020-02-27 DIAGNOSIS — I10 ESSENTIAL HYPERTENSION: ICD-10-CM

## 2020-02-27 DIAGNOSIS — E78.2 MIXED HYPERLIPIDEMIA: ICD-10-CM

## 2020-02-27 LAB
CHOLEST SERPL-MCNC: 143 MG/DL (ref 0–200)
HDLC SERPL-MCNC: 46 MG/DL (ref 40–60)
LDLC SERPL CALC-MCNC: 65 MG/DL (ref 0–100)
LDLC/HDLC SERPL: 1.41 {RATIO}
TRIGL SERPL-MCNC: 161 MG/DL (ref 0–150)
VLDLC SERPL-MCNC: 32.2 MG/DL (ref 5–40)

## 2020-02-27 PROCEDURE — 99204 OFFICE O/P NEW MOD 45 MIN: CPT | Performed by: INTERNAL MEDICINE

## 2020-02-27 PROCEDURE — 36415 COLL VENOUS BLD VENIPUNCTURE: CPT

## 2020-02-27 PROCEDURE — 80061 LIPID PANEL: CPT

## 2020-02-27 PROCEDURE — 93000 ELECTROCARDIOGRAM COMPLETE: CPT | Performed by: INTERNAL MEDICINE

## 2020-02-27 RX ORDER — LANCETS
EACH MISCELLANEOUS
COMMUNITY
Start: 2020-01-08

## 2020-02-27 RX ORDER — NABUMETONE 500 MG/1
TABLET, FILM COATED ORAL
COMMUNITY
Start: 2020-01-07

## 2020-02-27 NOTE — PROGRESS NOTES
PATIENTINFORMATION    Date of Office Visit: 2020  Encounter Provider: Alphonso Hendrix MD  Place of Service: [facility] CARDIOLOGY  Patient Name: Dafne Elizalde  : 1965    Subjective:     Encounter Date:2020      Patient ID: Dafne Elizalde is a 54 y.o. female.    Chief Complaint   Patient presents with   • Palpitations   • Abnormal ECG     HPI  Ms. Elizalde is a 54 years old female patient with past medical history of treated hypertension, hyperlipidemia, morbid obesity was referred to cardiology clinic for evaluation of abnormal EKG noted during colonoscopy procedure.  Patient reports having worsening exertional shortness of breath of several months duration.  Overall mild to moderate and she could barely walk past three quarters of a mile as she used to without having shortness of breath and had to take a break.  Walking uphill is becoming more challenging.  She denied any chest discomfort or pain, orthopnea, PND, extremity swelling.  She gets occasional palpitations that are fleeting lasting for few seconds and happen once or twice a week.  No presyncope or syncope.  No prior cardiovascular testing.  And during colonoscopy she was noted to have left anterior fascicular block seems to have unchanged from prior EKGs.  She has been on antihypertensive for many years and her blood pressure is well controlled.  Has been taking fenofibrate for unspecified hyperlipidemia.  She was a started on metformin for' borderline diabetes per patient.  She reports symptoms concerning for obstructive sleep apnea like excessive daytime somnolence, waking up feeling tired or fatigued and wanting to take naps during daytime.  She has had problems with weight for years and now she is changing her diet by significantly reducing carbohydrates to lose weight.  She does not regularly exercise and she works at a correctional facility at the desk .    ROS   All systems reviewed.  Chronic  low back pain.  Otherwise negative review of systems.    Past Medical History:   Diagnosis Date   • Anemia    • Arthritis    • Benign fundic gland polyps of stomach     of large intestine   • Cardiac murmur    • Colon polyp    • Diabetes mellitus (CMS/HCC)    • Disease of thyroid gland    • Diverticulosis    • Elevated cholesterol    • Esophageal reflux    • High cholesterol    • History of colonic polyps    • Hole in the ear drum    • Hypertension    • Internal hemorrhoids    • Otitis externa    • Peptic ulcer    • Pyloric channel ulcer    • Wound infection after surgery        Past Surgical History:   Procedure Laterality Date   • ABDOMINAL SURGERY     • BACK SURGERY     • CHOLECYSTECTOMY     • COLON SURGERY     • COLONOSCOPY N/A 12/6/2017    Procedure: COLONOSCOPY with polpectomy;  Surgeon: Beena Wall MD;  Location: [facility];  Service:    • COLONOSCOPY N/A 1/10/2020    Procedure: COLONOSCOPY, polypectomy;  Surgeon: Beena Wall MD;  Location: [facility] OR;  Service: Gastroenterology   • COLOSTOMY     • COLOSTOMY CLOSURE     • FRACTURE SURGERY     • HAND SURGERY     • HEMICOLECTOMY     • HYSTERECTOMY     • LUMBAR LAMINECTOMY     • MOUTH SURGERY      tooth extraction   • UPPER GASTROINTESTINAL ENDOSCOPY         Social History     Socioeconomic History   • Marital status: Single     Spouse name: Not on file   • Number of children: Not on file   • Years of education: Not on file   • Highest education level: Not on file   Tobacco Use   • Smoking status: Current Every Day Smoker     Packs/day: 1.00     Years: 40.00     Pack years: 40.00     Types: Cigarettes, Electronic Cigarette   • Smokeless tobacco: Never Used   Substance and Sexual Activity   • Alcohol use: Yes     Comment: occ   • Drug use: No   • Sexual activity: Defer       Family History   Problem Relation Age of Onset   • Hypertension Mother    • Hyperlipidemia Mother    • Colonic polyp Mother    • Other Mother         malignant neoplasm   • Colon cancer  "Mother    • Hypertension Father    • Stroke Father    • Cancer Sister    • Other Sister         malignant neoplasm   • Cancer Brother    • Other Brother         malignant neoplasm   • Cancer Maternal Grandmother    • Stroke Paternal Grandmother    • Other Other         malignant neoplasm           ECG 12 Lead  Date/Time: 2/27/2020 12:26 PM  Performed by: Alphonso Hendrix MD  Authorized by: Alphonso Hendrix MD   Comparison: compared with previous ECG from 1/10/2020  Rhythm: sinus rhythm  Rate: normal  Conduction: left anterior fascicular block  ST Segments: ST segments normal  T Waves: T waves normal  QRS axis: left  Other: no other findings    Clinical impression: abnormal EKG               Objective:     /68 (BP Location: Left arm, Patient Position: Sitting)   Pulse 63   Resp 18   Ht 165.1 cm (65\")   Wt 131 kg (288 lb 1.6 oz)   SpO2 98%   BMI 47.94 kg/m²  Body mass index is 47.94 kg/m².     Physical Exam   Constitutional: She is oriented to person, place, and time. She appears well-developed and well-nourished. No distress.   Morbidly obese   HENT:   Head: Normocephalic and atraumatic.   Eyes: Pupils are equal, round, and reactive to light. EOM are normal.   Neck: Normal range of motion. Neck supple. No thyromegaly present.   Cardiovascular: Normal rate, regular rhythm, normal heart sounds and intact distal pulses. Exam reveals no gallop and no friction rub.   No murmur heard.  Pulmonary/Chest: Effort normal and breath sounds normal. No respiratory distress. She has no wheezes. She has no rales. She exhibits no tenderness.   Abdominal: Soft. Bowel sounds are normal. She exhibits no distension. There is no guarding.   Musculoskeletal: Normal range of motion. She exhibits no edema or deformity.   Neurological: She is alert and oriented to person, place, and time. She has normal reflexes. No cranial nerve deficit.   Skin: Skin is warm and dry. No rash noted. She is not diaphoretic.   Psychiatric: " She has a normal mood and affect. Judgment normal.       Review Of Data: I have obtained and reviewed prior labs and records.      Assessment/Plan:         Exertional shortness of breath    Hyperlipidemia LDL goal <100    Mixed hyperlipidemia    Essential hypertension    Left anterior fascicular block    Morbidly obese (CMS/HCC)     Patient has exertional shortness of breath and significant risk factors for coronary artery disease like diabetes, hyperlipidemia, hypertension and morbid obesity.  Shortness of breath could be anginal given.  Get vasodilator SPECT patient is unable to exercise because of back pain.  Blood pressure is well controlled and will continue current antihypertensive medication with valsartan and hydrochlorothiazide.  Determine lipid panel to determine intensity of statin treatment.  She has old left anterior fascicular block which will be monitored clinically.  Patient encouraged to increase level of activity and also encouraged to lose weight.  Offered her referral to dietitian that she deferred for now.  Send for sleep studies.      Diagnosis and plan of care discussed with patient and verbalized understanding.           Alphonso Hendrix MD  02/27/20  12:27 PM

## 2020-02-28 ENCOUNTER — TELEPHONE (OUTPATIENT)
Dept: CARDIOLOGY | Facility: CLINIC | Age: 55
End: 2020-02-28

## 2020-02-28 RX ORDER — ICOSAPENT ETHYL 1000 MG/1
2 CAPSULE ORAL 2 TIMES DAILY WITH MEALS
Qty: 60 CAPSULE | Refills: 3 | Status: SHIPPED | OUTPATIENT
Start: 2020-02-28 | End: 2020-08-17

## 2020-02-28 RX ORDER — ATORVASTATIN CALCIUM 40 MG/1
40 TABLET, FILM COATED ORAL DAILY
Qty: 90 TABLET | Refills: 3 | Status: SHIPPED | OUTPATIENT
Start: 2020-02-28 | End: 2021-02-26

## 2020-02-28 NOTE — TELEPHONE ENCOUNTER
----- Message from Alphonso Hendrix MD sent at 2/28/2020  1:34 PM EST -----  Please call to notify patient that I reviewed her lipid panel.  Triglycerides are still mildly elevated.  Advised patient to pick new prescriptions for 2 medications: Atorvastatin and Vascepa  Advised patient to stop taking fenofibrate.  Let me know if she has any questions.  Thanks

## 2020-03-03 ENCOUNTER — TELEPHONE (OUTPATIENT)
Dept: CARDIOLOGY | Facility: CLINIC | Age: 55
End: 2020-03-03

## 2020-03-03 NOTE — TELEPHONE ENCOUNTER
Received a fax today from pts pharmacy stating the pt's new rx for vascepa is not covered by their insurance.  Would you like to recommend an alternative or would you like us to do a prior authorization?  Thanks.  [facility] RMA

## 2020-03-09 ENCOUNTER — HOSPITAL ENCOUNTER (OUTPATIENT)
Dept: CARDIOLOGY | Facility: HOSPITAL | Age: 55
Discharge: HOME | End: 2020-03-09

## 2020-03-09 ENCOUNTER — APPOINTMENT (OUTPATIENT)
Dept: NUCLEAR MEDICINE | Facility: HOSPITAL | Age: 55
End: 2020-03-09

## 2020-03-09 ENCOUNTER — HOSPITAL ENCOUNTER (OUTPATIENT)
Dept: NUCLEAR MEDICINE | Facility: HOSPITAL | Age: 55
Discharge: HOME | End: 2020-03-09

## 2020-03-09 DIAGNOSIS — R06.02 EXERTIONAL SHORTNESS OF BREATH: ICD-10-CM

## 2020-03-09 PROCEDURE — 78452 HT MUSCLE IMAGE SPECT MULT: CPT | Performed by: INTERNAL MEDICINE

## 2020-03-09 PROCEDURE — 93017 CV STRESS TEST TRACING ONLY: CPT

## 2020-03-09 PROCEDURE — 93016 CV STRESS TEST SUPVJ ONLY: CPT | Performed by: INTERNAL MEDICINE

## 2020-03-09 PROCEDURE — 25010000002 REGADENOSON 0.4 MG/5ML SOLUTION: Performed by: INTERNAL MEDICINE

## 2020-03-09 PROCEDURE — 93018 CV STRESS TEST I&R ONLY: CPT | Performed by: INTERNAL MEDICINE

## 2020-03-09 PROCEDURE — A9500 TC99M SESTAMIBI: HCPCS | Performed by: INTERNAL MEDICINE

## 2020-03-09 PROCEDURE — 0 TECHNETIUM SESTAMIBI: Performed by: INTERNAL MEDICINE

## 2020-03-09 PROCEDURE — 78452 HT MUSCLE IMAGE SPECT MULT: CPT

## 2020-03-09 RX ADMIN — TECHNETIUM TC 99M SESTAMIBI 1 DOSE: 1 INJECTION INTRAVENOUS at 09:59

## 2020-03-09 RX ADMIN — REGADENOSON 0.4 MG: 0.08 INJECTION, SOLUTION INTRAVENOUS at 09:59

## 2020-03-10 ENCOUNTER — HOSPITAL ENCOUNTER (OUTPATIENT)
Dept: CARDIOLOGY | Facility: HOSPITAL | Age: 55
Discharge: HOME | End: 2020-03-10

## 2020-03-10 ENCOUNTER — HOSPITAL ENCOUNTER (OUTPATIENT)
Dept: NUCLEAR MEDICINE | Facility: HOSPITAL | Age: 55
Discharge: HOME | End: 2020-03-10

## 2020-03-10 ENCOUNTER — APPOINTMENT (OUTPATIENT)
Dept: NUCLEAR MEDICINE | Facility: HOSPITAL | Age: 55
End: 2020-03-10

## 2020-03-10 LAB
BH CV NUCLEAR PRIOR STUDY: 3
BH CV STRESS BP STAGE 1: NORMAL
BH CV STRESS COMMENTS STAGE 1: NORMAL
BH CV STRESS DOSE REGADENOSON STAGE 1: 0.4
BH CV STRESS DURATION MIN STAGE 1: 0
BH CV STRESS DURATION SEC STAGE 1: 30
BH CV STRESS HR STAGE 1: 83
BH CV STRESS O2 STAGE 1: 97
BH CV STRESS PROTOCOL 1: NORMAL
BH CV STRESS RECOVERY BP: NORMAL MMHG
BH CV STRESS RECOVERY HR: 79 BPM
BH CV STRESS RECOVERY O2: 93 %
BH CV STRESS STAGE 1: 1
LV EF NUC BP: 74 %
MAXIMAL PREDICTED HEART RATE: 163 BPM
PERCENT MAX PREDICTED HR: 54.6 %
STRESS BASELINE BP: NORMAL MMHG
STRESS BASELINE HR: 62 BPM
STRESS O2 SAT REST: 94 %
STRESS PERCENT HR: 64 %
STRESS POST ESTIMATED WORKLOAD: 1 METS
STRESS POST EXERCISE DUR SEC: 30 SEC
STRESS POST O2 SAT PEAK: 97 %
STRESS POST PEAK BP: NORMAL MMHG
STRESS POST PEAK HR: 89 BPM
STRESS TARGET HR: 139 BPM

## 2020-03-10 PROCEDURE — A9500 TC99M SESTAMIBI: HCPCS | Performed by: INTERNAL MEDICINE

## 2020-03-10 PROCEDURE — 0 TECHNETIUM SESTAMIBI: Performed by: INTERNAL MEDICINE

## 2020-03-10 RX ADMIN — TECHNETIUM TC 99M SESTAMIBI 1 DOSE: 1 INJECTION INTRAVENOUS at 07:47

## 2020-03-11 ENCOUNTER — TELEPHONE (OUTPATIENT)
Dept: CARDIOLOGY | Facility: CLINIC | Age: 55
End: 2020-03-11

## 2020-03-11 NOTE — TELEPHONE ENCOUNTER
Pt called and left a VM stating that she received a call regarding results on a test she had done on Monday.     Pt is requesting a call back regarding the results, she is at work until 3:30 and said she can be reached at [phone] ext: [phone]

## 2020-03-12 ENCOUNTER — APPOINTMENT (OUTPATIENT)
Dept: SLEEP MEDICINE | Facility: HOSPITAL | Age: 55
End: 2020-03-12

## 2020-03-19 NOTE — TELEPHONE ENCOUNTER
Foot Pain: Care Instructions  Your Care Instructions  Foot injuries that cause pain and swelling are fairly common. Almost all sports or home repair projects can cause a misstep that ends up as foot pain. Normal wear and tear, especially as you get older, also can cause foot pain. Most minor foot injuries will heal on their own, and home treatment is usually all you need to do. If you have a severe injury, you may need tests and treatment. Follow-up care is a key part of your treatment and safety. Be sure to make and go to all appointments, and call your doctor if you are having problems. It's also a good idea to know your test results and keep a list of the medicines you take. How can you care for yourself at home? · Take pain medicines exactly as directed. ? If the doctor gave you a prescription medicine for pain, take it as prescribed. ? If you are not taking a prescription pain medicine, ask your doctor if you can take an over-the-counter medicine. · Rest and protect your foot. Take a break from any activity that may cause pain. · Put ice or a cold pack on your foot for 10 to 20 minutes at a time. Put a thin cloth between the ice and your skin. · Prop up the sore foot on a pillow when you ice it or anytime you sit or lie down during the next 3 days. Try to keep it above the level of your heart. This will help reduce swelling. · Your doctor may recommend that you wrap your foot with an elastic bandage. Keep your foot wrapped for as long as your doctor advises. · If your doctor recommends crutches, use them as directed. · Wear roomy footwear. · As soon as pain and swelling end, begin gentle exercises of your foot. Your doctor can tell you which exercises will help. When should you call for help? Call 911 anytime you think you may need emergency care.  For example, call if:    · Your foot turns pale, white, blue, or cold.    Call your doctor now or seek immediate medical care if:    · You cannot OK thanks. [facility]   move or stand on your foot.     · Your foot looks twisted or out of its normal position.     · Your foot is not stable when you step down.     · You have signs of infection, such as:  ? Increased pain, swelling, warmth, or redness. ? Red streaks leading from the sore area. ? Pus draining from a place on your foot. ? A fever.     · Your foot is numb or tingly.    Watch closely for changes in your health, and be sure to contact your doctor if:    · You do not get better as expected.     · You have bruises from an injury that last longer than 2 weeks. Where can you learn more? Go to http://jesse-michael.info/. Enter B061 in the search box to learn more about \"Foot Pain: Care Instructions. \"  Current as of: November 29, 2017  Content Version: 11.8  © 5794-8026 [employer]. Care instructions adapted under license by [employer] (which disclaims liability or warranty for this information). If you have questions about a medical condition or this instruction, always ask your healthcare professional. [place] [address] any warranty or liability for your use of this information. Elevated Blood Pressure: Care Instructions  Your Care Instructions    Blood pressure is a measure of how hard the blood pushes against the walls of your arteries. It's normal for blood pressure to go up and down throughout the day. But if it stays up over time, you have high blood pressure. Two numbers tell you your blood pressure. The first number is the systolic pressure. It shows how hard the blood pushes when your heart is pumping. The second number is the diastolic pressure. It shows how hard the blood pushes between heartbeats, when your heart is relaxed and filling with blood. An ideal blood pressure in adults is less than 120/80 (say \"120 over 80\"). High blood pressure is 140/90 or higher.  You have high blood pressure if your top number is 140 or higher or your bottom number is 90 or higher, or both. The main test for high blood pressure is simple, fast, and painless. To diagnose high blood pressure, your doctor will test your blood pressure at different times. After testing your blood pressure, your doctor may ask you to test it again when you are home. If you are diagnosed with high blood pressure, you can work with your doctor to make a long-term plan to manage it. Follow-up care is a key part of your treatment and safety. Be sure to make and go to all appointments, and call your doctor if you are having problems. It's also a good idea to know your test results and keep a list of the medicines you take. How can you care for yourself at home? · Do not smoke. Smoking increases your risk for heart attack and stroke. If you need help quitting, talk to your doctor about stop-smoking programs and medicines. These can increase your chances of quitting for good. · Stay at a healthy weight. · Try to limit how much sodium you eat to less than 2,300 milligrams (mg) a day. Your doctor may ask you to try to eat less than 1,500 mg a day. · Be physically active. Get at least 30 minutes of exercise on most days of the week. Walking is a good choice. You also may want to do other activities, such as running, swimming, cycling, or playing tennis or team sports. · Avoid or limit alcohol. Talk to your doctor about whether you can drink any alcohol. · Eat plenty of fruits, vegetables, and low-fat dairy products. Eat less saturated and total fats. · Learn how to check your blood pressure at home. When should you call for help? Call your doctor now or seek immediate medical care if:  ? · Your blood pressure is much higher than normal (such as 180/110 or higher). ? · You think high blood pressure is causing symptoms such as:  ¨ Severe headache. ¨ Blurry vision. ? Watch closely for changes in your health, and be sure to contact your doctor if:  ? · You do not get better as expected. Where can you learn more? Go to http://jesse-michael.info/. Enter Y358 in the search box to learn more about \"Elevated Blood Pressure: Care Instructions. \"  Current as of: September 21, 2016  Content Version: 11.4  © 8088-1252 [employer]. Care instructions adapted under license by [employer] (which disclaims liability or warranty for this information). If you have questions about a medical condition or this instruction, always ask your healthcare professional. [place] [address] any warranty or liability for your use of this information. Plantar Fasciitis: Exercises  Your Care Instructions  Here are some examples of typical rehabilitation exercises for your condition. Start each exercise slowly. Ease off the exercise if you start to have pain. Your doctor or physical therapist will tell you when you can start these exercises and which ones will work best for you. How to do the exercises  Towel stretch    1. Sit with your legs extended and knees straight. 2. Place a towel around your foot just under the toes. 3. Hold each end of the towel in each hand, with your hands above your knees. 4. Pull back with the towel so that your foot stretches toward you. 5. Hold the position for at least 15 to 30 seconds. 6. Repeat 2 to 4 times a session, up to 5 sessions a day. Calf stretch    1. Stand facing a wall with your hands on the wall at about eye level. Put the leg you want to stretch about a step behind your other leg. 2. Keeping your back heel on the floor, bend your front knee until you feel a stretch in the back leg. 3. Hold the stretch for 15 to 30 seconds. Repeat 2 to 4 times. Plantar fascia and calf stretch    1. Stand on a step as shown above. Be sure to hold on to the banister. 2. Slowly let your heels down over the edge of the step as you relax your calf muscles.  You should feel a gentle stretch across the bottom of your foot and up the back of your leg to your knee. 3. Hold the stretch about 15 to 30 seconds, and then tighten your calf muscle a little to bring your heel back up to the level of the step. Repeat 2 to 4 times. Towel curls    1. While sitting, place your foot on a towel on the floor and scrunch the towel toward you with your toes. 2. Then, also using your toes, push the towel away from you. [place] pickups    1. Put marbles on the floor next to a cup.  2. Using your toes, try to lift the marbles up from the floor and put them in the cup. Follow-up care is a key part of your treatment and safety. Be sure to make and go to all appointments, and call your doctor if you are having problems. It's also a good idea to know your test results and keep a list of the medicines you take. Where can you learn more? Go to http://jesseGuardiummichael.info/. Tracy Askew in the search box to learn more about \"Plantar Fasciitis: Exercises. \"  Current as of: November 29, 2017  Content Version: 11.8  © 7449-5976 [employer]. Care instructions adapted under license by [employer] (which disclaims liability or warranty for this information). If you have questions about a medical condition or this instruction, always ask your healthcare professional. [address] any warranty or liability for your use of this information. Plantar Fasciitis: Care Instructions  Your Care Instructions    Plantar fasciitis is pain and inflammation of the plantar fascia, the tissue at the bottom of your foot that connects the heel bone to the toes. The plantar fascia also supports the arch. If you strain the plantar fascia, it can develop small tears and cause heel pain when you stand or walk. Plantar fasciitis can be caused by running or other sports. It also may occur in people who are overweight or who have high arches or flat feet.  You may get plantar fasciitis if you walk or stand for long periods, or have a tight Achilles tendon or calf muscles. You can improve your foot pain with rest and other care at home. It might take a few weeks to a few months for your foot to heal completely. Follow-up care is a key part of your treatment and safety. Be sure to make and go to all appointments, and call your doctor if you are having problems. It's also a good idea to know your test results and keep a list of the medicines you take. How can you care for yourself at home? · Rest your feet often. Reduce your activity to a level that lets you avoid pain. If possible, do not run or walk on hard surfaces. · Take pain medicines exactly as directed. ? If the doctor gave you a prescription medicine for pain, take it as prescribed. ? If you are not taking a prescription pain medicine, take an over-the-counter anti-inflammatory medicine for pain and swelling, such as ibuprofen (Advil, Motrin) or naproxen (Aleve). Read and follow all instructions on the label. · Use ice massage to help with pain and swelling. You can use an ice cube or an ice cup several times a day. To make an ice cup, fill a paper cup with water and freeze it. Cut off the top of the cup until a half-inch of ice shows. Hold onto the remaining paper to use the cup. Rub the ice in small circles over the area for 5 to 7 minutes. · Contrast baths, which alternate hot and cold water, can also help reduce swelling. But because heat alone may make pain and swelling worse, end a contrast bath with a soak in cold water. · Wear a night splint if your doctor suggests it. A night splint holds your foot with the toes pointed up and the foot and ankle at a 90-degree angle. This position gives the bottom of your foot a constant, gentle stretch. · Do simple exercises such as calf stretches and towel stretches 2 to 3 times each day, especially when you first get up in the morning. These can help the plantar fascia become more flexible.  They also make the muscles that support your arch stronger. Hold these stretches for 15 to 30 seconds per stretch. Repeat 2 to 4 times. ? Stand about 1 foot from a wall. Place the palms of both hands against the wall at chest level. Lean forward against the wall, keeping one leg with the knee straight and heel on the ground while bending the knee of the other leg.  ? Sit down on the floor or a mat with your feet stretched in front of you. Roll up a towel lengthwise, and loop it over the ball of your foot. Holding the towel at both ends, gently pull the towel toward you to stretch your foot. · Wear shoes with good arch support. Athletic shoes or shoes with a well-cushioned sole are good choices. · Try heel cups or shoe inserts (orthotics) to help cushion your heel. You can buy these at many shoe stores. · Put on your shoes as soon as you get out of bed. Going barefoot or wearing slippers may make your pain worse. · Reach and stay at a good weight for your height. This puts less strain on your feet. When should you call for help? Call your doctor now or seek immediate medical care if:    · You have heel pain with fever, redness, or warmth in your heel.     · You cannot put weight on the sore foot.    Watch closely for changes in your health, and be sure to contact your doctor if:    · You have numbness or tingling in your heel.     · Your heel pain lasts more than 2 weeks. Where can you learn more? Go to http://jesse-michael.info/. Marycarmen Obregon in the search box to learn more about \"Plantar Fasciitis: Care Instructions. \"  Current as of: November 29, 2017  Content Version: 11.8  © 9569-4851 [employer]. Care instructions adapted under license by [employer] (which disclaims liability or warranty for this information).  If you have questions about a medical condition or this instruction, always ask your healthcare professional. Shannon Michel disclaims any warranty or liability for your use of this information.

## 2020-08-17 ENCOUNTER — OFFICE VISIT (OUTPATIENT)
Dept: CARDIOLOGY | Facility: CLINIC | Age: 55
End: 2020-08-17

## 2020-08-17 VITALS
HEIGHT: 65 IN | OXYGEN SATURATION: 98 % | HEART RATE: 70 BPM | SYSTOLIC BLOOD PRESSURE: 140 MMHG | WEIGHT: 293 LBS | DIASTOLIC BLOOD PRESSURE: 84 MMHG | BODY MASS INDEX: 48.82 KG/M2

## 2020-08-17 DIAGNOSIS — E78.00 PURE HYPERCHOLESTEROLEMIA: ICD-10-CM

## 2020-08-17 DIAGNOSIS — E66.01 MORBIDLY OBESE (HCC): Primary | ICD-10-CM

## 2020-08-17 DIAGNOSIS — I44.4 LEFT ANTERIOR FASCICULAR BLOCK: ICD-10-CM

## 2020-08-17 DIAGNOSIS — I10 ESSENTIAL HYPERTENSION: ICD-10-CM

## 2020-08-17 DIAGNOSIS — R06.02 EXERTIONAL SHORTNESS OF BREATH: ICD-10-CM

## 2020-08-17 DIAGNOSIS — Z72.0 TOBACCO USE: ICD-10-CM

## 2020-08-17 PROCEDURE — 93000 ELECTROCARDIOGRAM COMPLETE: CPT | Performed by: INTERNAL MEDICINE

## 2020-08-17 PROCEDURE — 99214 OFFICE O/P EST MOD 30 MIN: CPT | Performed by: INTERNAL MEDICINE

## 2020-08-17 RX ORDER — VARENICLINE TARTRATE 1 MG/1
1 TABLET, FILM COATED ORAL 2 TIMES DAILY
Qty: 90 TABLET | Refills: 1 | Status: SHIPPED | OUTPATIENT
Start: 2020-08-17

## 2020-08-17 NOTE — PROGRESS NOTES
PATIENTINFORMATION    Date of Office Visit: 2020  Encounter Provider: Alphonso Hendrix MD  Place of Service: [facility] CARDIOLOGY  Patient Name: Dafne Elizalde  : 1965    Subjective:     Encounter Date:2020      Patient ID: Dafne Elizalde is a 55 y.o. female.    Chief Complaint   Patient presents with   • Hyperlipidemia     6 months follow up    • Hypertension     HPI  Ms. Elizalde is a 55 years old female patient with past medical history of morbid obesity, hypertension hyperlipidemia, type 2 DM and chronic tobacco came to cardiology clinic for follow-up visit.  Since I saw her last time in 2020 she had vasodilator SPECT for evaluation of exertional shortness of breath and was not suggestive of ischemia, but her body mass index and breast tissue interfere with study.  She denied any significant change in symptoms since then and she continues to have some shortness of breath and she admits that she has gained some weight and admits not being compliant with diet.  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  She does not exercise regularly.  She denied any significant chest pain, orthopnea, PND or extremity swelling.  Even if she does not check her blood pressure at home she reports being compliant with her antihypertensive and statin regimen.  Otherwise no ER visits or hospitalizations or significant acute events since her last visit here in clinic      ROS   All systems reviewed.  Patient reports some chronic leg and joint pains otherwise negative review of systems.      Past Medical History:   Diagnosis Date   • Anemia    • Arthritis    • Benign fundic gland polyps of stomach     of large intestine   • Cardiac murmur    • Colon polyp    • Diabetes mellitus (CMS/HCC)    • Disease of thyroid gland    • Diverticulosis    •  Elevated cholesterol    • Esophageal reflux    • High cholesterol    • History of colonic polyps    • Hole in the ear drum    • Hypertension    • Internal hemorrhoids    • Otitis externa    • Peptic ulcer    • Pyloric channel ulcer    • Wound infection after surgery        Past Surgical History:   Procedure Laterality Date   • ABDOMINAL SURGERY     • BACK SURGERY     • CHOLECYSTECTOMY     • COLON SURGERY     • COLONOSCOPY N/A 12/6/2017    Procedure: COLONOSCOPY with polpectomy;  Surgeon: Beena Wall MD;  Location: [facility] OR;  Service:    • COLONOSCOPY N/A 1/10/2020    Procedure: COLONOSCOPY, polypectomy;  Surgeon: Beena Wall MD;  Location: [facility] OR;  Service: Gastroenterology   • COLOSTOMY     • COLOSTOMY CLOSURE     • FRACTURE SURGERY     • HAND SURGERY     • HEMICOLECTOMY     • HYSTERECTOMY     • LUMBAR LAMINECTOMY     • MOUTH SURGERY      tooth extraction   • UPPER GASTROINTESTINAL ENDOSCOPY         Social History     Socioeconomic History   • Marital status: Single     Spouse name: Not on file   • Number of children: Not on file   • Years of education: Not on file   • Highest education level: Not on file   Tobacco Use   • Smoking status: Current Every Day Smoker     Packs/day: 1.00     Years: 40.00     Pack years: 40.00     Types: Cigarettes, Electronic Cigarette   • Smokeless tobacco: Never Used   Substance and Sexual Activity   • Alcohol use: Yes     Comment: occ   • Drug use: No   • Sexual activity: Defer       Family History   Problem Relation Age of Onset   • Hypertension Mother    • Hyperlipidemia Mother    • Colonic polyp Mother    • Other Mother         malignant neoplasm   • Colon cancer Mother    • Hypertension Father    • Stroke Father    • Cancer Sister    • Other Sister         malignant neoplasm   • Cancer Brother    • Other Brother         malignant neoplasm   • Cancer Maternal Grandmother    • Stroke Paternal Grandmother    • Other Other         malignant neoplasm           ECG 12  "Lead  Date/Time: 8/17/2020 2:46 PM  Performed by: Alphonso Hendrix MD  Authorized by: Alphonso Hendrix MD   Comparison: compared with previous ECG from 2/27/2020  Rhythm: sinus rhythm  Rate: normal  Conduction: left anterior fascicular block  ST Segments: ST segments normal  T Waves: T waves normal  QRS axis: normal  Other findings: poor R wave progression    Clinical impression: abnormal EKG               Objective:     /84   Pulse 70   Ht 165.1 cm (65\")   Wt 135 kg (298 lb)   SpO2 98%   BMI 49.59 kg/m²  Body mass index is 49.59 kg/m².     Physical Exam   Constitutional: She is oriented to person, place, and time. She appears well-developed and well-nourished. No distress.   HENT:   Head: Normocephalic and atraumatic.   Eyes: Pupils are equal, round, and reactive to light. EOM are normal.   Neck: Normal range of motion. Neck supple. No thyromegaly present.   Cardiovascular: Normal rate, regular rhythm, normal heart sounds and intact distal pulses. Exam reveals no gallop and no friction rub.   No murmur heard.  Pulmonary/Chest: Effort normal and breath sounds normal. No respiratory distress. She has no wheezes. She has no rales. She exhibits no tenderness.   Abdominal: Soft. Bowel sounds are normal. She exhibits no distension. There is no guarding.   Musculoskeletal: Normal range of motion. She exhibits no edema or deformity.   Neurological: She is alert and oriented to person, place, and time. She has normal reflexes. No cranial nerve deficit.   Skin: Skin is warm and dry. No rash noted. She is not diaphoretic.   Psychiatric: She has a normal mood and affect. Judgment normal.       Review Of Data: I have reviewed labs and documents and a stress test since last visit      Assessment/Plan:         Morbidly obese (CMS/HCC)    Exertional shortness of breath    Pure hypercholesterolemia    Essential hypertension    Left anterior fascicular block    Tobacco use     Exertional shortness of breath " unchanged since last visit.  Negative vasodilator SPECT.  No significant new symptoms since last visit.  Blood pressure controlled on Diovan.  EKG unchanged.   I have counseled her about importance of tobacco cessation and offered her Chantix as she agreed.  I will also refer to dietitian for nutritional intervention and I encouraged patient to increase level of activity and modify her diet like by trying Mediterranean type to lose weight., Refer her for sleep study.  Otherwise continue high intensity statin and hypertensive for now  I have encouraged patient to call clinic with worsening or new symptoms    Diagnosis and plan of care discussed with patient and verbalized understanding.           Alphonso Hendrix MD  08/17/20  14:51

## 2020-08-28 ENCOUNTER — APPOINTMENT (OUTPATIENT)
Dept: SLEEP MEDICINE | Facility: HOSPITAL | Age: 55
End: 2020-08-28

## 2021-02-26 RX ORDER — ATORVASTATIN CALCIUM 40 MG/1
TABLET, FILM COATED ORAL
Qty: 90 TABLET | Refills: 3 | Status: SHIPPED | OUTPATIENT
Start: 2021-02-26

## 2021-10-15 ENCOUNTER — TRANSCRIBE ORDERS (OUTPATIENT)
Dept: ADMINISTRATIVE | Facility: HOSPITAL | Age: 56
End: 2021-10-15

## 2021-10-15 DIAGNOSIS — R94.4 ABNORMAL RESULTS OF KIDNEY FUNCTION STUDIES: Primary | ICD-10-CM

## 2021-10-25 ENCOUNTER — HOSPITAL ENCOUNTER (OUTPATIENT)
Dept: ULTRASOUND IMAGING | Facility: HOSPITAL | Age: 56
Discharge: HOME | End: 2021-10-25
Admitting: NURSE PRACTITIONER

## 2021-10-25 DIAGNOSIS — R94.4 ABNORMAL RESULTS OF KIDNEY FUNCTION STUDIES: ICD-10-CM

## 2021-10-25 PROCEDURE — 76775 US EXAM ABDO BACK WALL LIM: CPT

## 2023-12-28 ENCOUNTER — OFFICE VISIT (OUTPATIENT)
Dept: SLEEP MEDICINE | Facility: HOSPITAL | Age: 58
End: 2023-12-28
Payer: COMMERCIAL

## 2023-12-28 VITALS
HEART RATE: 72 BPM | BODY MASS INDEX: 46.32 KG/M2 | HEIGHT: 65 IN | SYSTOLIC BLOOD PRESSURE: 150 MMHG | WEIGHT: 278 LBS | OXYGEN SATURATION: 97 % | DIASTOLIC BLOOD PRESSURE: 85 MMHG

## 2023-12-28 DIAGNOSIS — R06.81 WITNESSED EPISODE OF APNEA: ICD-10-CM

## 2023-12-28 DIAGNOSIS — R29.818 SUSPECTED SLEEP APNEA: Primary | ICD-10-CM

## 2023-12-28 DIAGNOSIS — E66.01 CLASS 3 SEVERE OBESITY WITH BODY MASS INDEX (BMI) OF 45.0 TO 49.9 IN ADULT, UNSPECIFIED OBESITY TYPE, UNSPECIFIED WHETHER SERIOUS COMORBIDITY PRESENT: ICD-10-CM

## 2023-12-28 DIAGNOSIS — R06.83 SNORING: ICD-10-CM

## 2023-12-28 DIAGNOSIS — I10 ESSENTIAL HYPERTENSION: ICD-10-CM

## 2023-12-28 DIAGNOSIS — K21.9 GASTROESOPHAGEAL REFLUX DISEASE, UNSPECIFIED WHETHER ESOPHAGITIS PRESENT: ICD-10-CM

## 2023-12-28 DIAGNOSIS — G47.19 EXCESSIVE DAYTIME SLEEPINESS: ICD-10-CM

## 2023-12-28 DIAGNOSIS — Z72.821 INADEQUATE SLEEP HYGIENE: ICD-10-CM

## 2023-12-28 PROCEDURE — G0463 HOSPITAL OUTPT CLINIC VISIT: HCPCS

## 2023-12-28 NOTE — PROGRESS NOTES
[facility]  [address]  [address]  [place] [address]  Phone   Fax         Dafne Elizalde  [id number]   1965  58 y.o.  female      Referring physician/provider and PCP Valencia Marrero APRN    Type of service: Initial Sleep Medicine Consult.  Date of service: 12/28/2023      Chief Complaint   Patient presents with    Snoring    Witnessed Apnea       History of present illness;  The patient was seen today on 12/28/2023 at [facility].    Thank you for asking to see Dafne Elizalde, 58 y.o. PMHx of HTN, NIDDM, GERD, Morbid Obesity.  The patient presents for initial evaluation of sleep sleep disordered breathing.  Patient  denies prior surgery namely tonsillectomy, nasal surgery or UPPP.     Loud snoring - by girlfriend >1 year time  Witnessed apneas in the past when she had surgery for ruptured bowel in 2015 in PACU   Never had sleep study   Her sister has sleep apnea      Obstructive Sleep Apnea Screening: STOP-BANG Sleep Apnea Questionnaire. Reference: Mauricio F et al. Br J Anaesth, 2012.     Criterion    Yes    No  Do you SNORE loudly?   [x]   Yes  []   No   Do you often feel TIRED, fatigued, or sleepy during the day?    [x]   Yes  []   No  Has anyone OBSERVED you stop breathing during your sleep?    [x]   Yes  []   No  Do you have or are you being treated for high blood PRESSURE?    [x]   Yes  []   No  BMI >32 kg/m2     [x]   Yes  []   No  AGE > 50 years    [x]   Yes  []   No  NECK circumference >16 inches / 40 cm    [x]   Yes  []   No  GENDER: male     []   Yes  [x]   No    FREDRICK Probability:  []   1-2 - Low  []   3-4 - Intermediate  [x]   5-8 - High    -Obesity  Wt Readings from Last 3 Encounters:   12/28/23 126 kg (278 lb)   08/17/20 135 kg (298 lb)   02/27/20 131 kg (288 lb 1.6 oz)         Further Sleep History:    Bedtime: Weekdays 11 PM-2:30 AM; weekends midnight-3 AM  Rise Time: Weekdays 6:30 AM; weekends noon-1 PM  Sleep Latency: 1-2  hours  Screens in bed: Yes  Wake after sleep onset: Once  Reasons for awakenings: Nocturia or GERD  Number of naps per day up to 2/day  Naps restorative: Denies  Caffeine use: 2 beverage per day 1 coffee 1 soda    RLS Symptoms: No   Bruxism:No   Current sleep related gastroesophageal reflux symptoms:  yes  on ppi by PCP states she's currently on protonix it is not helping in the past lansoprazole was helping but insurance barrier prevented from continuing this medication. She has never had g.I evaluation.   Cataplexy:  No   Sleep Paralysis:  No   Hypnagogic or hypnopompic hallucinations: No   Parasomnias such as sleep walking or sleep eating No     Disclaimer Sleep History: The above sleep history is based on this sleep physician's in room encounter with the patient. Pre encounter self administered questionnaires are taken into consideration and discussed with patient for any discordance. The above documentation by this sleep physician is the most accurate clinical information determined by in room sleep physician encounter with patient.     MEDICAL CONDITIONS (PMH)   HTN  NIDDM  GERD  Morbid Obesity    Social history:  Do you drive a commercial vehicle:  No   Shift work:  No   Tobacco use:  Yes 1 ppd since age 14  Alcohol use: 3-4 per week  Occupation: Administrative/business office    Family Hx (parents and siblings) (pertaining to sleep medicine)  Sleep apnea - sister  Thyroid disorder    Medications: reviewed    Review of systems is negative unless otherwise noted per HPI   Disclaimer History: The above history is based on this sleep physician's in room encounter with the patient. Pre encounter self administered questionnaires are taken into consideration and discussed with patient for any discordance. The above documentation by this sleep physician is the most accurate clinical information determined by in room sleep physician encounter with patient.     Physical exam:  Vitals:    12/28/23 1300   BP: 150/85  "  Pulse: 72   SpO2: 97%   Weight: 126 kg (278 lb)   Height: 165.1 cm (65\")    Body mass index is 46.26 kg/m².   CONSTITUTIONAL:  Non-toxic, In no overt distress   Head: normocephalic   ENT: Mallampati class II, + macroglossia, no septal defects   NECK:Neck Circumference: 18.5 inches,no nuchal rigidity  RESPIRATORY SYSTEM: Breath sounds are clear (no rales, no rhonchi, no wheezes), no accessory muscle use  CARDIOVASULAR SYSTEM: Heart sounds are regular rhythm and normal rate, no rub, no gallop, no edema  NEUROLOGICAL SYSTEM: Oriented x 3, No gross focal deficits   PSYCHIATRIC SYSTEM: Goal oriented, affect full range appropriate      Office notes from care team reviewed:        -8/17/2020 Office Visit Cardio Dr. Hendrix    Labs reviewed.      no recent labs  available for my review   -2/27/2020   Tchol 143  LDL 65  Triglycerides 161     Imaging/Diagnostics reviewed:     -3/10/2020 Stress Test cardiologist's nterpretation Summary    · Myocardial perfusion imaging indicates a normal myocardial perfusion study with no evidence of ischemia. Impressions are consistent with a low risk study.  · Study was technically difficult due to the presence of breast attenuation. Additionally, in the raw images, the liver appears to be enlarged.         Assessment and plan:  Suspected sleep apnea [R29.818]/Witnessed episode of apnea [R06.81]    patient's symptoms and examination is consistent with sleep apnea (G47.30). I have talked to the patient about the signs and symptoms of sleep apnea. In addition, I have also discussed pathophysiology of sleep apnea.  I also discussed the complications of untreated sleep apnea including effects on hypertension, diabetes mellitus and nonrestorative sleep with hypersomnia which can increase risk for motor vehicle accidents.  Untreated sleep apnea is also a risk factor for development of atrial fibrillation, hypertension, insulin resistance and cerebrovascular accident.  Discussed in detail of " various testing methods including home-based and lab based sleep studies.  Based on history and physical examination and other comorbidities the most appropriate study is Home Sleep Study.  The order for the sleep study is placed in [facility].  The test will be scheduled after approval from insurance. Treatment and management will be discussed after the test is completed.  High pretest probability STOP-BANG 7/8, macroglossia, Mallampati is II.  Home sleep study may rule in sleep apnea.  However, under patient's specific clinical circumstances home sleep study may not rule out sleep apnea.  If home sleep testing is negative must proceed with in laboratory polysomnography to definitively rule out sleep apnea (the prior was discussed with patient). Patient was given opportunity to ask questions and all the questions were answered.   Snoring (R06.83), snoring is the sound created by turbulent airflow vibrating upper airway soft tissue due to limitation of inspiratory airflow. I have also discussed factors affecting snoring including sleep deprivation, sleeping on the back and alcohol ingestion. To minimize snoring, patient is advised to have adequate sleep, sleep on the side and avoid alcohol and sedative medications before bedtime  Excessive daytime sleepiness .  Patient endorses subjective excessive daytime sleepiness with sleep physician encounter which was consistent with patient's pre-encounter self-administered Gru[place] La[place] Sleepiness Scale of Total score: 15.  There are many causes for daytime excessive sleepiness including sleep depression, shiftwork syndrome, depression and other medical disorders including heart, kidney and liver failure.  This is sleep disordered breathing until proven otherwise. The most common cause of excessive sleepiness is due to sleep apnea with frequent awakenings during sleep time.  I have discussed safety of driving and to remain vigilant while driving; patient verbalized understanding of  counseling.  Obesity, patient's BMI is Body mass index is 46.26 kg/m².. I have discussed the relationship between weight and sleep apnea.There is direct correlation between weight and severity of sleep apnea.  Weight reduction is encouraged, as it is going to reduce the severity of sleep apnea. I have also discussed with the patient diet and exercise to achieve ideal body weight.  Inadequate sleep hygiene [Z72.821]  Multiple inadequate circumstances of sleep preclude a diagnosis of insomnia. Thorough in room counseling today. Counseled lifestyle modifications as below to be applied especially night of any sleep study, verbalized understanding of same. Follow up with PCP to reinforce my counseling towards healthy lifestyle modifications if sleep studies are negative.  HTN,  Follow up with primary care physician for continued management. This medical condition would make the patient eligible for a trial of PAP therapy even if sleep study reveals mild severity sleep apnea.  GERD, This is a known intrinsic disrupter of sleep which patient endorses as cause of her sleep disturbance. Patient endorsed to me that current protonix is not helping but when she was on lansoprazole in the past it helped significantly. Counseled her to follow up with PCP for management of GERD. I recommended that she discuss with her PCP Kyle referral to discuss PPI management and GERD evaluation.       I have also discussed with the patient the following  Sleep hygiene: Maintain a regular bedtime and wake time, not to watch television or use screens in bed, limit caffeine-containing beverages before bed time and avoid naps during the day, reserve bed for intimacy or sleep only.  If patient encounters 20 minutes or more in bed without sleep, then instructed to get out of bed to chair, in dimly lit or dark area, pursue a boring/non-stimulating activity, and return to bed only when sleepy (repeat this step as often as necessary).  Adequate amount of  sleep.  Generally most people needs about 7 to 8 hours of sleep.      Return for 31 to 90 days after PAP setup with down load.  Patient's questions were answered      I once again thank you for asking me to see this patient in consultation and I have forwarded my opinion and treatment plan.  Please do not hesitate to call me if you have any questions.       EMR Dragon/Transcription disclaimer:   Much of this encounter note is an electronic transcription/translation of spoken language to printed text. The electronic translation of spoken language may permit erroneous, or at times, nonsensical words or phrases to be inadvertently transcribed; Although I have reviewed the note for such errors, some may still exist.     NPI #: [id number]    Geni Watt, DO  Sleep Medicine  [facility]  12/28/23

## 2024-01-02 ENCOUNTER — HOSPITAL ENCOUNTER (OUTPATIENT)
Dept: SLEEP MEDICINE | Facility: HOSPITAL | Age: 59
Discharge: HOME | End: 2024-01-02
Admitting: FAMILY MEDICINE
Payer: COMMERCIAL

## 2024-01-02 DIAGNOSIS — R06.83 SNORING: ICD-10-CM

## 2024-01-02 DIAGNOSIS — R29.818 SUSPECTED SLEEP APNEA: ICD-10-CM

## 2024-01-02 DIAGNOSIS — R06.81 WITNESSED EPISODE OF APNEA: ICD-10-CM

## 2024-01-02 DIAGNOSIS — G47.19 EXCESSIVE DAYTIME SLEEPINESS: ICD-10-CM

## 2024-01-02 PROCEDURE — 95806 SLEEP STUDY UNATT&RESP EFFT: CPT

## 2024-01-03 DIAGNOSIS — G47.33 OBSTRUCTIVE SLEEP APNEA, ADULT: Primary | ICD-10-CM

## 2024-01-03 DIAGNOSIS — G47.34 SLEEP RELATED HYPOXIA: ICD-10-CM

## 2024-01-04 ENCOUNTER — TELEPHONE (OUTPATIENT)
Dept: SLEEP MEDICINE | Facility: HOSPITAL | Age: 59
End: 2024-01-04
Payer: COMMERCIAL

## 2024-01-18 ENCOUNTER — DOCUMENTATION (OUTPATIENT)
Dept: SLEEP MEDICINE | Facility: HOSPITAL | Age: 59
End: 2024-01-18
Payer: COMMERCIAL

## 2024-01-18 DIAGNOSIS — G47.33 OBSTRUCTIVE SLEEP APNEA, ADULT: Primary | ICD-10-CM

## 2024-01-18 NOTE — PROGRESS NOTES
Date 1/18/2024      Received letter today by the patient's insurance that the patient's peer to peer physician refuses my order for PAP titration despite multiple clear reasons that were listed.     To prevent any further unnecessary delay of care by insurance's peer to peer physician      A&P  FREDRICK  Sleep related hypoxia      -Order has been placed to start the patient on auto CPAP 8-20 cm H2O, ramp at 8 cm H2O for 20 minutes, EPR 2  -Notified sleep staff to inform patient to follow-up with me after obtaining and using CPAP within 31-90 days.  -With follow-up visits I will determine further testing only when I determine appropriate.    NPI #: [id number]    Geni Watt, DO  Sleep Medicine  [facility]  01/18/24

## 2024-01-26 ENCOUNTER — TELEPHONE (OUTPATIENT)
Dept: SLEEP MEDICINE | Facility: HOSPITAL | Age: 59
End: 2024-01-26
Payer: COMMERCIAL

## 2024-01-26 NOTE — TELEPHONE ENCOUNTER
Called patient, unable to contact. Sending a letter to advise. Sending order for CPAP to [facility]. Compliance visit scheduled.

## 2024-03-28 ENCOUNTER — OFFICE VISIT (OUTPATIENT)
Dept: SLEEP MEDICINE | Facility: HOSPITAL | Age: 59
End: 2024-03-28
Payer: COMMERCIAL

## 2024-03-28 VITALS
WEIGHT: 279 LBS | DIASTOLIC BLOOD PRESSURE: 79 MMHG | BODY MASS INDEX: 46.48 KG/M2 | OXYGEN SATURATION: 95 % | HEART RATE: 72 BPM | HEIGHT: 65 IN | SYSTOLIC BLOOD PRESSURE: 138 MMHG

## 2024-03-28 DIAGNOSIS — I10 ESSENTIAL HYPERTENSION: ICD-10-CM

## 2024-03-28 DIAGNOSIS — Z91.89 AT RISK FOR EXTREME OBESITY WITH ALVEOLAR HYPOVENTILATION: ICD-10-CM

## 2024-03-28 DIAGNOSIS — G47.34 SLEEP RELATED HYPOXIA: ICD-10-CM

## 2024-03-28 DIAGNOSIS — G47.19 EXCESSIVE DAYTIME SLEEPINESS: ICD-10-CM

## 2024-03-28 DIAGNOSIS — G47.33 OBSTRUCTIVE SLEEP APNEA, ADULT: Primary | ICD-10-CM

## 2024-03-28 PROCEDURE — G0463 HOSPITAL OUTPT CLINIC VISIT: HCPCS

## 2024-03-28 NOTE — PROGRESS NOTES
[facility]  [address]  [address]  [place] [address]  Phone   Fax     SLEEP CLINIC FOLLOW UP PROGRESS NOTE.    Dafne Elizalde  [id number]   1965  59 y.o.  female      PCP: Valencia Marrero APRN      Date of visit: 3/28/2024    Chief Complaint   Patient presents with    Sleep Apnea     CPAP intolerance       Medications and allergies are reviewed by me and documented in the encounter.     SOCIAL (habits pertaining to sleep medicine)  History tobacco use:Yes current tobacco use  History of alcohol use: 3-6 per week  Caffeine use: 1 beverage/d     HPI:  This is a 59 y.o. PMHx HTN.  Here for management of obstructive sleep apnea (SITA 18.7/h with sleep-related hypoxia on 1/3/2024 HST; titration study was ordered refused by insurance provider) Patient is using positive airway pressure therapy and the symptoms of sleep apnea have improved significantly on the therapy. Normally patient goes to bed at 1030 PM and wakes up at 630 AM .  The patient wakes up 1x time(s) during the night and has no problem going back to sleep.  Feels refreshed after waking up.     Overall patient's Impression of their PAP therapy is: Not well   Air pressures too much on exhalation  EPR is maxed   She prefers to stay with nasal mask   She has sleep maintenance issue due to air pressures struggling to stay compliant with CPAP  Endorses excessive daytime sleepiness despite good compliance epworth 12/24         -Sleep related hypoxia was noted night of her home sleep study   States she did NOT sleep well night of home sleep study due to anxious about test and device being on her     Compliance data is reviewed by me with patient in room today  Date range 1/30/2024 - 3/26/2024  Overall use 86%  4-hour vale 74%  Average days used 5 hours 46 minutes  Device AirSense 10 AutoSet  Auto CPAP 8-20 cm H2O, EPR 3  95th percentile pressure is 10.46 cm H2O  Maximum pressure is 11 cm H2O  95th percentile  "pressure is 26.5 LPM  Residual AHI 0.7  DME: [employer]  Mask used: Nasal - brand unspecified prefers her current mask     -Obesity  Body mass index is 46.43 kg/m².  No bicarb/abg available  Wt Readings from Last 3 Encounters:   03/28/24 127 kg (279 lb)   12/28/23 126 kg (278 lb)   08/17/20 135 kg (298 lb)     -BP in sleep clinic 138/79   Compliant with home therapies reviewed     -Last seen by me aproximately 3 months ago on 12/28/2023 at that time loud snoring, witnessed apneas and her sister also had sleep apnea  -1/18/2024 patient's insurance provider refused a titration study auto CPAP ordered to prevent any further inappropriate delay care by the patient's insurance provider      Denies any past CVA/TIA/neurologic disorders/neuromuscular disorder/known cardiopulmonary conditions  Denies need for supplemental O2 at home in the past  Denies any metal in head/neck/chest    REVIEW OF SYSTEMS:   Is negative unless otherwise noted in HPI  [place] Sleepiness Scale :Total score: 12     Disclaimer History: The above history is based on this sleep physician's in room encounter with the patient. Pre encounter self administered questionnaires are taken into consideration and discussed with patient for any discordance. The above documentation by this sleep physician is the most accurate clinical information determined by in room sleep physician encounter with patient.     PHYSICAL EXAMINATION:  Vitals:    03/28/24 1500   BP: 138/79   Pulse: 72   SpO2: 95%   Weight: 127 kg (279 lb)   Height: 165.1 cm (65\")    Body mass index is 46.43 kg/m².   CONSTITUTIONAL: Well appearing, in no overt pain or respiratory distress   ENT: Mallampati IV, Macroglossia  RESP SYSTEM:  Breath sounds are clear bilateral (no rales/rhonchi/wheezes), No overt respiratory distress, speaks in clear sentences without dyspnea, no accessory muscle use  CARDIOVASULAR: RRR, No rub, no gallop, No edema noted  NEURO: Oriented x 3, no gross focal deficits "   Psych: Pleasant, goal oriented         ASSESSMENT AND PLAN:  Obstructive sleep apnea, adult [G47.33]  Sleep related hypoxia [G47.34]  At risk for extreme obesity with alveolar hypoventilation  Excessive daytime sleepiness   Intolerance of continuous positive airway pressure (CPAP) ventilation [Z78.9]  -Positive Airway Pressure Titration ordered to guide management of sleep disordered breathing: EPR is maxed may benefit from bilevel without back up rate titration to guide management   Patient's symptoms and examination is consistent with sleep apnea. have talked to the patient about the signs and symptoms of sleep apnea. In addition, I have also discussed pathophysiology of sleep apnea.  I also discussed the complications of untreated sleep apnea including effects on hypertension, diabetes mellitus and nonrestorative sleep with hypersomnia which can increase risk for motor vehicle accidents.  Untreated sleep apnea is also a risk factor for development of atrial fibrillation, hypertension, insulin resistance and cerebrovascular accident. Counseled no driving or operating heavy machinery while sleepy. Patient was given opportunity to ask questions and all the questions were answered.   -Continue current PAP until direction from titration order for supplies sent   Obesity, with BMI is Body mass index is 46.43 kg/m².. Counseled weight loss will be beneficial for reduction in severity of sleep apnea, healthy diet/exercise to achieve same, follow up with primary care physician for serial monitoring and to further guide management.  Essential hypertension [I10], Compliant wit home therapies reviewed.  Counseled patient PAP therapy compliance for treatment of obstructive sleep apnea may be beneficial for this comorbid condition.  Follow-up with PCP as previous for hypertension       Follow up after titration . Patient's questions were answered.        EMR Dragon/Transcription disclaimer:   Much of this encounter note is an  electronic transcription/translation of spoken language to printed text. The electronic translation of spoken language may permit erroneous, or at times, nonsensical words or phrases to be inadvertently transcribed; Although I have reviewed the note for such errors, some may still exist.       NPI #: [id number]    Geni Watt, DO  Sleep Medicine  [facility]  03/28/24

## 2024-05-09 ENCOUNTER — HOSPITAL ENCOUNTER (OUTPATIENT)
Dept: SLEEP MEDICINE | Facility: HOSPITAL | Age: 59
End: 2024-05-09
Payer: COMMERCIAL

## 2024-05-09 DIAGNOSIS — G47.33 OBSTRUCTIVE SLEEP APNEA, ADULT: ICD-10-CM

## 2024-05-09 DIAGNOSIS — G47.19 EXCESSIVE DAYTIME SLEEPINESS: ICD-10-CM

## 2024-05-09 DIAGNOSIS — G47.34 SLEEP RELATED HYPOXIA: ICD-10-CM

## 2024-05-09 DIAGNOSIS — Z91.89 AT RISK FOR EXTREME OBESITY WITH ALVEOLAR HYPOVENTILATION: ICD-10-CM

## 2024-05-09 PROCEDURE — 95811 POLYSOM 6/>YRS CPAP 4/> PARM: CPT

## 2024-05-15 DIAGNOSIS — I10 ESSENTIAL HYPERTENSION: ICD-10-CM

## 2024-05-15 DIAGNOSIS — G47.33 OBSTRUCTIVE SLEEP APNEA, ADULT: Primary | ICD-10-CM

## 2024-08-08 ENCOUNTER — OFFICE VISIT (OUTPATIENT)
Dept: SLEEP MEDICINE | Facility: HOSPITAL | Age: 59
End: 2024-08-08
Payer: COMMERCIAL

## 2024-08-08 VITALS
HEIGHT: 65 IN | OXYGEN SATURATION: 97 % | WEIGHT: 286 LBS | DIASTOLIC BLOOD PRESSURE: 77 MMHG | BODY MASS INDEX: 47.65 KG/M2 | HEART RATE: 66 BPM | SYSTOLIC BLOOD PRESSURE: 132 MMHG

## 2024-08-08 DIAGNOSIS — G47.33 OBSTRUCTIVE SLEEP APNEA, ADULT: Primary | ICD-10-CM

## 2024-08-08 DIAGNOSIS — E66.01 CLASS 3 SEVERE OBESITY WITH SERIOUS COMORBIDITY AND BODY MASS INDEX (BMI) OF 45.0 TO 49.9 IN ADULT, UNSPECIFIED OBESITY TYPE: ICD-10-CM

## 2024-08-08 DIAGNOSIS — I10 ESSENTIAL HYPERTENSION: ICD-10-CM

## 2024-08-08 PROCEDURE — 99214 OFFICE O/P EST MOD 30 MIN: CPT | Performed by: FAMILY MEDICINE

## 2024-08-08 PROCEDURE — G0463 HOSPITAL OUTPT CLINIC VISIT: HCPCS

## 2024-08-08 NOTE — PROGRESS NOTES
"  [facility]  [address]  [address]  [place] [address]  Phone   Fax     SLEEP CLINIC FOLLOW UP PROGRESS NOTE.    Dafne Elizalde  [id number]   1965  59 y.o.  female      PCP: Valencia Marrero APRN      Date of visit: 8/8/2024    Chief Complaint   Patient presents with    Sleep Apnea       Medications and allergies are reviewed by me and documented in the encounter.     SOCIAL (habits pertaining to sleep medicine)  History tobacco use:Yes cigarettes   History of alcohol use: 4-8 per week  Caffeine use: 2 beverages/d    HPI:  This is a 59 y.o. PMHx of HTN  Here for management of obstructive sleep apnea (SITA 18.7/h with sleep-related hypoxia on 1/3/2024 HST; s/p titration study on 5/9/2024 oxygenation adequate on PAP started on auto-bilevel).. Patient is using positive airway pressure therapy and the symptoms of sleep apnea have improved significantly on the therapy. Normally patient goes to bed at 11 PM and wakes up at 630 AM .  The patient wakes up 1 time(s) during the night and has no problem going back to sleep.  Feels refreshed after waking up.     Overall patient's Impression of their PAP therapy is:  States since switching from CPAP to my order for the Auto-BiLevel it's made verbatim statement \"a big difference\"  States she feels less sleepy  Her sleep is more restorative  Her mood is better  No near miss or MVC due to sleepiness   She feels great about air pressures   Current mask by descriptor is a nasal wisp mask prefers to stay with this mask   She is not using mouth tape anymore  She prefers not to use a FFM   She decided to stay with [employer]oflow    Compliance data as reviewed by me with patient room today:  Date range 7/8/2024 - 8/6/2024  Overall use 100%  4-hour vale 77%  Device air curve 11 V-auto  Max IPAP 25 cm H2O  Min EPAP 8 cm H2O  Pressure support 4 cm H2O  95th percentile leak is 9.8 LPM  AHI 0.3 at goal  95th percentile IPAP 13.0 cm H2O  95th " "percentile EPAP 9.0 cm H2O  DME: [employer]  Mask used: Nasal Wisp - prefers this mask     -BP in  sleep clinic  States she feels well today 132/77  Compliant with home therapies reviewed valsartan-HCTz    -Obesity  Wt Readings from Last 3 Encounters:   08/08/24 130 kg (286 lb)   03/28/24 127 kg (279 lb)   12/28/23 126 kg (278 lb)         -Last seen by me ~5 months ago on 3/28/2024 AHI 0.7, DME [employer], mask nasal, air pressures too much on exhalation EPR was max3 on auto CPAP.  [place] 12/24.  Titration which was refused by insurance provider in the past was ordered again.      REVIEW OF SYSTEMS:   Is negative unless otherwise noted in HPI  Pre-encounter [place] Sleepiness Scale :Total score: 11 (prior visit 12/24) - denies subjective excessive daytime sleepiness with me in room since start autoBiLevel    Disclaimer History: The above history is based on this sleep physician's in room encounter with the patient. Pre encounter self administered questionnaires are taken into consideration and discussed with patient for any discordance. The above documentation by this sleep physician is the most accurate clinical information determined by in room sleep physician encounter with patient.     PHYSICAL EXAMINATION:  Vitals:    08/08/24 1500   BP: 132/77   Pulse: 66   SpO2: 97%   Weight: 130 kg (286 lb)   Height: 165.1 cm (65\")    Body mass index is 47.59 kg/m².   CONSTITUTIONAL: Well appearing, in no overt pain or respiratory distress   NOSE: No septal defect  RESP SYSTEM:  No overt respiratory distress, speaks in clear sentences without dyspnea, no accessory muscle use  CARDIOVASULAR: No edema noted  NEURO: Oriented x 3, no gross focal deficits     Compliance data as reviewed by me with patient room today:  Date range 7/8/2024 - 8/6/2024  Overall use 100%  4-hour vale 77%  Device air curve 11 V-auto  Max IPAP 25 cm H2O  Min EPAP 8 cm H2O  Pressure support 4 cm H2O  95th percentile leak is 9.8 LPM  AHI 0.3 at goal  95th " percentile IPAP 13.0 cm H2O  95th percentile EPAP 9.0 cm H2O  DME: [employer]  Mask used: Nasal Wisp - prefers this mask     ASSESSMENT AND PLAN:  Obstructive sleep apnea ( G 47.33).    -Specific Changes made by me today:  I. After review of compliance data, in visit clinical correlation, and through shared decision making: will not make any changes to PAP therapy settings.  II.  Counseled patient to follow-up with me in 1 year for therapy review.  Counseled may request sooner follow-up to sleep clinic anytime the patient feels necessary.  The symptoms of sleep apnea have improved with the device and the treatment.  Patient's compliance with the device is excellent for treatment of sleep apnea.  I have independently reviewed the smart card down load and discussed with the patient the download data and encouarged the patient to continue to use the device.The residual AHI is acceptable. The device is benefiting the patient and the device is medically necessary.  Without proper control of sleep apnea and good compliance there is a increased risk for hypertension, diabetes mellitus and nonrestorative sleep with hypersomnia which can increase risk for motor vehicle accidents.  Untreated sleep apnea is also a risk factor for development of atrial fibrillation, pulmonary hypertension, insulin resistance and stroke. The patient is also instructed to get the supplies from the [facility] and and change them on a regular basis.  A prescription for supplies has been sent to the [facility].  I have also discussed the good sleep hygiene habits and adequate amount of sleep needed for good health.  Obesity, with BMI is Body mass index is 47.59 kg/m².. Her therapy is at goal / no subjective complaint of excessive daytime sleepiness - I see no point in pursuing an ABG off PAP when she's doing so well. Counseled weight loss will be beneficial for reduction in severity of sleep apnea, healthy diet/exercise to achieve same, follow up  with primary care physician for serial monitoring and to further guide management.  Essential hypertension [I10], Compliant wit home therapies reviewed.  Counseled patient PAP therapy compliance for treatment of obstructive sleep apnea may be beneficial for this comorbid condition.  Follow-up with PCP as previous for hypertension       Follow up in 1 year . Patient's questions were answered.        EMR Dragon/Transcription disclaimer:   Much of this encounter note is an electronic transcription/translation of spoken language to printed text. The electronic translation of spoken language may permit erroneous, or at times, nonsensical words or phrases to be inadvertently transcribed; Although I have reviewed the note for such errors, some may still exist.       NPI #: [id number]    Geni Watt,   Sleep Medicine  Psychiatric  08/08/24

## 2024-12-17 ENCOUNTER — HOSPITAL ENCOUNTER (OUTPATIENT)
Dept: GENERAL RADIOLOGY | Facility: HOSPITAL | Age: 59
Discharge: HOME | End: 2024-12-17
Payer: COMMERCIAL

## 2024-12-17 ENCOUNTER — TRANSCRIBE ORDERS (OUTPATIENT)
Dept: ADMINISTRATIVE | Facility: HOSPITAL | Age: 59
End: 2024-12-17
Payer: COMMERCIAL

## 2024-12-17 DIAGNOSIS — M25.512 BILATERAL SHOULDER PAIN, UNSPECIFIED CHRONICITY: ICD-10-CM

## 2024-12-17 DIAGNOSIS — M54.6 THORACIC SPINE PAIN: ICD-10-CM

## 2024-12-17 DIAGNOSIS — M25.511 BILATERAL SHOULDER PAIN, UNSPECIFIED CHRONICITY: ICD-10-CM

## 2024-12-17 DIAGNOSIS — M54.12 BRACHIAL NEURITIS: Primary | ICD-10-CM

## 2024-12-17 DIAGNOSIS — M54.2 CERVICALGIA: ICD-10-CM

## 2024-12-17 DIAGNOSIS — M25.511 RIGHT SHOULDER PAIN, UNSPECIFIED CHRONICITY: ICD-10-CM

## 2024-12-17 DIAGNOSIS — M54.6 PAIN IN THORACIC SPINE: ICD-10-CM

## 2024-12-17 PROCEDURE — 72070 X-RAY EXAM THORAC SPINE 2VWS: CPT

## 2024-12-17 PROCEDURE — 72040 X-RAY EXAM NECK SPINE 2-3 VW: CPT

## 2024-12-17 PROCEDURE — 73030 X-RAY EXAM OF SHOULDER: CPT

## 2025-02-05 ENCOUNTER — TELEPHONE (OUTPATIENT)
Dept: CARDIOLOGY | Facility: CLINIC | Age: 60
End: 2025-02-05
Payer: COMMERCIAL

## 2025-02-05 NOTE — TELEPHONE ENCOUNTER
Received call from [facility] ([phone]) following up on a referral that was faxed for Dafne Elizalde.  Placed call to [place] office and spoke with Savannah, who states she has been playing phone tag with patient regarding scheduling this f/u.  Savannah agreeable to speak with staff member and call was transferred to her.    Thank you,  Aissatou HOWARD RN  Triage Nurse G  02/05/25 16:11 EST

## 2025-03-26 ENCOUNTER — OFFICE VISIT (OUTPATIENT)
Dept: CARDIOLOGY | Facility: CLINIC | Age: 60
End: 2025-03-26
Payer: COMMERCIAL

## 2025-03-26 VITALS
OXYGEN SATURATION: 96 % | WEIGHT: 288 LBS | BODY MASS INDEX: 47.98 KG/M2 | SYSTOLIC BLOOD PRESSURE: 124 MMHG | HEART RATE: 63 BPM | HEIGHT: 65 IN | DIASTOLIC BLOOD PRESSURE: 72 MMHG

## 2025-03-26 DIAGNOSIS — R93.89 ABNORMAL X-RAY: Primary | ICD-10-CM

## 2025-03-26 DIAGNOSIS — E11.59 TYPE 2 DIABETES MELLITUS WITH ATHEROSCLEROSIS OF AORTA: ICD-10-CM

## 2025-03-26 DIAGNOSIS — E78.00 PURE HYPERCHOLESTEROLEMIA: ICD-10-CM

## 2025-03-26 DIAGNOSIS — I44.4 LEFT ANTERIOR FASCICULAR BLOCK: ICD-10-CM

## 2025-03-26 DIAGNOSIS — I70.0 TYPE 2 DIABETES MELLITUS WITH ATHEROSCLEROSIS OF AORTA: ICD-10-CM

## 2025-03-26 DIAGNOSIS — I10 PRIMARY HYPERTENSION: ICD-10-CM

## 2025-03-26 DIAGNOSIS — F17.210 TOBACCO DEPENDENCE DUE TO CIGARETTES: ICD-10-CM

## 2025-03-26 PROBLEM — G47.33 OSA ON CPAP: Status: ACTIVE | Noted: 2025-03-26

## 2025-03-26 PROCEDURE — 93000 ELECTROCARDIOGRAM COMPLETE: CPT | Performed by: INTERNAL MEDICINE

## 2025-03-26 PROCEDURE — 99204 OFFICE O/P NEW MOD 45 MIN: CPT | Performed by: INTERNAL MEDICINE

## 2025-03-26 RX ORDER — DULOXETIN HYDROCHLORIDE 60 MG/1
120 CAPSULE, DELAYED RELEASE ORAL DAILY
COMMUNITY

## 2025-03-26 RX ORDER — FENOFIBRATE 200 MG/1
200 CAPSULE ORAL
COMMUNITY

## 2025-03-26 RX ORDER — OXYBUTYNIN CHLORIDE 5 MG/1
5 TABLET ORAL 2 TIMES DAILY
COMMUNITY

## 2025-03-26 RX ORDER — SEMAGLUTIDE 2.68 MG/ML
2 INJECTION, SOLUTION SUBCUTANEOUS WEEKLY
COMMUNITY

## 2025-03-26 RX ORDER — EZETIMIBE 10 MG/1
10 TABLET ORAL DAILY
COMMUNITY
Start: 2021-12-26

## 2025-03-26 RX ORDER — FERROUS SULFATE 325(65) MG
325 TABLET, DELAYED RELEASE (ENTERIC COATED) ORAL
COMMUNITY

## 2025-03-26 RX ORDER — FOLIC ACID 0.8 MG
800 TABLET ORAL 2 TIMES DAILY
COMMUNITY

## 2025-03-26 RX ORDER — LANSOPRAZOLE 30 MG/1
30 CAPSULE, DELAYED RELEASE ORAL DAILY
COMMUNITY

## 2025-03-26 RX ORDER — DOCUSATE SODIUM 50MG AND SENNOSIDES 8.6MG 8.6; 5 MG/1; MG/1
1 TABLET, FILM COATED ORAL DAILY
COMMUNITY

## 2025-03-26 NOTE — PROGRESS NOTES
Subjective:     Encounter Date:03/26/25      Patient ID: Dafne Elizalde is a 60 y.o. female.    Chief Complaint:  History of Present Illness    Dear Valencia,    I had the pleasure of seeing this patient in the office today for initial evaluation and consultation.  I appreciate that you sent her in to see us.  She has a history of morbid obesity, obstructive sleep apnea on CPAP, hypertension, diabetes, and hyperlipidemia.  They come in today to be seen for a shoulder x-ray that demonstrated some calcification in the bulb of the aorta.        No known cardiac history.  Specifically,  no history of coronary artery disease, congestive heart failure, rheumatic fever, rheumatic heart disease, or congenital heart disease.  This patient previously followed with Dr. Hendrix and saw him in 2020.  She had a nuclear perfusion study performed at that time that showed no ischemia.    She denies any chest pain, pressure, tightness, squeezing, or heartburn.  She has not experienced any feeling of palpitations, tachycardia or heart racing and no presyncope or syncope.  There has not been any problems with dizziness or lightheadedness.  There has not been any orthopnea or PND, and no problems with lower extremity edema.  She denies any shortness of breath at rest or with activity and has not had any wheezing.  She has continued to perform daily activities of living without any specific problem or change in the level of activity.      The following portions of the patient's history were reviewed and updated as appropriate: allergies, current medications, past family history, past medical history, past social history, past surgical history and problem list.      ECG 12 Lead    Date/Time: 3/26/2025 9:46 AM  Performed by: George Saunders III, MD    Authorized by: George Saunders III, MD  Comparison: compared with previous ECG   Similar to previous ECG  Rhythm: sinus rhythm  Rate: normal  Conduction: left anterior fascicular  "block  QRS axis: left  Other findings: non-specific ST-T wave changes and poor R wave progression    Clinical impression: non-specific ECG             Objective:     Vitals:    03/26/25 0912   BP: 124/72   BP Location: Right arm   Patient Position: Sitting   Cuff Size: Large Adult   Pulse: 63   SpO2: 96%   Weight: 131 kg (288 lb)   Height: 165.1 cm (65\")     Body mass index is 47.93 kg/m².      Vitals reviewed.   Constitutional:       General: Not in acute distress.     Appearance: Well-developed. Not diaphoretic.   Eyes:      General:         Right eye: No discharge.         Left eye: No discharge.      Conjunctiva/sclera: Conjunctivae normal.   HENT:      Head: Normocephalic and atraumatic.      Nose: Nose normal.   Neck:      Thyroid: No thyromegaly.      Trachea: No tracheal deviation.   Pulmonary:      Effort: Pulmonary effort is normal. No respiratory distress.      Breath sounds: Normal breath sounds. No stridor.   Chest:      Chest wall: Not tender to palpatation.   Cardiovascular:      Normal rate. Regular rhythm.      Murmurs: There is no murmur.      . No S3 gallop. No click. No rub.   Pulses:     Intact distal pulses.   Edema:     Peripheral edema absent.   Abdominal:      General: Bowel sounds are normal. There is no distension.      Palpations: Abdomen is soft. There is no abdominal mass.   Musculoskeletal: Normal range of motion.         General: No tenderness or deformity.      Cervical back: Normal range of motion and neck supple. Skin:     General: Skin is warm and dry.      Findings: No erythema or rash.   Neurological:      Mental Status: Alert.   Psychiatric:         Attention and Perception: Attention normal.         Data and records reviewed:     Lab Results   Component Value Date    GLUCOSE 124 (H) 11/29/2015    BUN 10 11/29/2015    CREATININE 0.75 11/29/2015     11/29/2015    K 3.5 01/10/2020     11/29/2015    CALCIUM 8.7 11/29/2015    PROTEINTOT 6.9 11/23/2015    ALBUMIN 4.1 " 11/23/2015    ALT 18 11/23/2015    AST 14 11/23/2015    ALKPHOS 42 11/23/2015    BILITOT 0.5 11/23/2015    EGFR >60 04/13/2015     Lab Results   Component Value Date    CHOL 143 02/27/2020     Lab Results   Component Value Date    TRIG 161 (H) 02/27/2020     Lab Results   Component Value Date    HDL 46 02/27/2020     Lab Results   Component Value Date    LDL 65 02/27/2020     Lab Results   Component Value Date    VLDL 32.2 02/27/2020     Lab Results   Component Value Date    LDLHDL 1.41 02/27/2020       No radiology results for the last 90 days.          Assessment:          Diagnosis Plan   1. Abnormal x-ray  CT Cardiac Calcium Score Without Dye      2. Type 2 diabetes mellitus with atherosclerosis of aorta  CT Cardiac Calcium Score Without Dye      3. Pure hypercholesterolemia        4. Primary hypertension        5. Left anterior fascicular block        6. Tobacco dependence due to cigarettes               Plan:       1.  Arteriosclerosis of the thoracic aorta with calcification noted in the aorta bulb on the shoulder x-ray-multiple cardiac risk factors outlined below, we will arrange for coronary artery calcium scan.  No symptoms  2.  Mixed hyperlipidemia on lipid-lowering therapy, AST, ALT reviewed  3.  Essential hypertension on valsartan HCTZ, creatinine, BUN reviewed  4.  Back dependence on cigarettes, smoking cessation is recommended  5.  Diabetes mellitus with circulatory complication with arteriosclerosis of the thoracic aorta.  Risk factor modification is recommended  6.  Obstructive sleep apnea on CPAP-patient claims compliance, states she uses it every night.    Thank you very much for allowing us to participate in the care of this pleasant patient.  Please don't hesitate to call if I can be of assistance in any way.      Current Outpatient Medications:     ACCU-CHEK SOFTCLIX LANCETS lancets, , Disp: , Rfl:     diclofenac (VOLTAREN) 50 MG EC tablet, Take 1 tablet by mouth 2 (Two) Times a Day As Needed.,  Disp: , Rfl:     DULoxetine (CYMBALTA) 60 MG capsule, Take 2 capsules by mouth Daily., Disp: , Rfl:     ezetimibe (ZETIA) 10 MG tablet, Take 1 tablet by mouth Daily., Disp: , Rfl:     fenofibrate micronized (LOFIBRA) 200 MG capsule, Take 1 capsule by mouth Every Morning Before Breakfast., Disp: , Rfl:     ferrous sulfate 325 (65 FE) MG EC tablet, Take 1 tablet by mouth Daily With Breakfast., Disp: , Rfl:     fluticasone (FLONASE) 50 MCG/ACT nasal spray, INSTILL 1 SPRAY IN EACH NOSTRIL ONCE A DAY NASALLY 90 DAYS, Disp: , Rfl: 1    folic acid (FOLVITE) 800 MCG tablet, Take 1 tablet by mouth 2 (Two) Times a Day., Disp: , Rfl:     lansoprazole (PREVACID) 30 MG capsule, Take 1 capsule by mouth Daily., Disp: , Rfl:     levocetirizine (XYZAL) 5 MG tablet, TAKE 1 TABLET BY MOUTH IN THE EVENING 90, Disp: , Rfl: 0    metFORMIN ER (GLUCOPHAGE-XR) 500 MG 24 hr tablet, Take 1 tablet by mouth Daily. with food, Disp: , Rfl: 0    Misc Natural Products (FIBER 7 PO), Take 1 tablet by mouth 2 (Two) Times a Day., Disp: , Rfl:     Omega-3 Fatty Acids (FISH OIL OMEGA-3 PO), Take 2 capsules by mouth 2 (Two) Times a Day., Disp: , Rfl:     oxybutynin (DITROPAN) 5 MG tablet, Take 1 tablet by mouth 2 (Two) Times a Day., Disp: , Rfl:     Ozempic, 2 MG/DOSE, 8 MG/3ML solution pen-injector, Inject 2 mg under the skin into the appropriate area as directed 1 (One) Time Per Week., Disp: , Rfl:     Senexon-S 8.6-50 MG per tablet, Take 1 tablet by mouth Daily., Disp: , Rfl:     valsartan-hydrochlorothiazide (DIOVAN-HCT) 160-25 MG per tablet, TAKE 1 TABLET DAILY, Disp: 90 tablet, Rfl: 2    vitamin D (ERGOCALCIFEROL) 20385 UNITS capsule capsule, TAKE 1 CAPSULE BY MOUTH ONCE A WEEK, Disp: , Rfl: 2         No follow-ups on file.

## 2025-08-07 ENCOUNTER — OFFICE VISIT (OUTPATIENT)
Dept: SLEEP MEDICINE | Facility: HOSPITAL | Age: 60
End: 2025-08-07
Payer: COMMERCIAL

## 2025-08-07 VITALS
BODY MASS INDEX: 45.82 KG/M2 | DIASTOLIC BLOOD PRESSURE: 72 MMHG | SYSTOLIC BLOOD PRESSURE: 132 MMHG | HEIGHT: 65 IN | WEIGHT: 275 LBS | OXYGEN SATURATION: 96 % | HEART RATE: 67 BPM

## 2025-08-07 DIAGNOSIS — G47.34 SLEEP RELATED HYPOXIA: ICD-10-CM

## 2025-08-07 DIAGNOSIS — G47.21 CIRCADIAN RHYTHM SLEEP DISORDER, DELAYED SLEEP PHASE TYPE: ICD-10-CM

## 2025-08-07 DIAGNOSIS — G47.33 OBSTRUCTIVE SLEEP APNEA, ADULT: Primary | ICD-10-CM

## 2025-08-07 PROCEDURE — G0463 HOSPITAL OUTPT CLINIC VISIT: HCPCS

## (undated) DEVICE — Device

## (undated) DEVICE — MASK,FACE,FLUID RESIST,SHLD,EARLOOP: Brand: MEDLINE

## (undated) DEVICE — FRCP BX RADJAW4 NDL 2.8 240CM LG OG BX40

## (undated) DEVICE — JACKT LAB KNIT COLR LG BLU

## (undated) DEVICE — GOWN ISOL W/THUMB UNIV BLU BX/15

## (undated) DEVICE — SPNG GZ WOVN 4X4IN 12PLY 10/BX STRL

## (undated) DEVICE — Device: Brand: DEFENDO AIR/WATER/SUCTION AND BIOPSY VALVE

## (undated) DEVICE — GLV SURG SENSICARE MICRO PF LF 6 STRL

## (undated) DEVICE — SUCTION CANISTER, 3000CC,SAFELINER: Brand: DEROYAL

## (undated) DEVICE — VIAL FORMALIN CAP 10P 40ML

## (undated) DEVICE — TRAP POLYP 4CHAMBER

## (undated) DEVICE — PAD GRND E/S W/CORD SPLT A/

## (undated) DEVICE — SYR LL 3CC

## (undated) DEVICE — BW-412T DISP COMBO CLEANING BRUSH: Brand: SINGLE USE COMBINATION CLEANING BRUSH

## (undated) DEVICE — SNAR POLYP CAPTIFLX WD OVL 27MM 240CM

## (undated) DEVICE — MASK,FACE,SHIELD,BLUE,ANTI FOG,TIES: Brand: MEDLINE

## (undated) DEVICE — SYR LUER SLPTP 50ML